# Patient Record
Sex: FEMALE | Race: WHITE | NOT HISPANIC OR LATINO
[De-identification: names, ages, dates, MRNs, and addresses within clinical notes are randomized per-mention and may not be internally consistent; named-entity substitution may affect disease eponyms.]

---

## 2017-10-31 ENCOUNTER — LABORATORY RESULT (OUTPATIENT)
Age: 57
End: 2017-10-31

## 2017-10-31 ENCOUNTER — APPOINTMENT (OUTPATIENT)
Dept: INTERNAL MEDICINE | Facility: CLINIC | Age: 57
End: 2017-10-31
Payer: COMMERCIAL

## 2017-10-31 PROCEDURE — 36415 COLL VENOUS BLD VENIPUNCTURE: CPT

## 2017-11-02 LAB
ALBUMIN SERPL ELPH-MCNC: 4.4 G/DL
ALP BLD-CCNC: 65 U/L
ALT SERPL-CCNC: 18 U/L
ANION GAP SERPL CALC-SCNC: 14 MMOL/L
AST SERPL-CCNC: 23 U/L
BASOPHILS # BLD AUTO: 0.02 K/UL
BASOPHILS NFR BLD AUTO: 0.3 %
BILIRUB SERPL-MCNC: 0.4 MG/DL
BUN SERPL-MCNC: 12 MG/DL
CALCIUM SERPL-MCNC: 9.7 MG/DL
CHLORIDE SERPL-SCNC: 102 MMOL/L
CHOLEST SERPL-MCNC: 206 MG/DL
CHOLEST/HDLC SERPL: 3.2 RATIO
CO2 SERPL-SCNC: 25 MMOL/L
CREAT SERPL-MCNC: 0.9 MG/DL
EOSINOPHIL # BLD AUTO: 0.09 K/UL
EOSINOPHIL NFR BLD AUTO: 1.5 %
ERYTHROCYTE [SEDIMENTATION RATE] IN BLOOD BY WESTERGREN METHOD: 9 MM/HR
GLUCOSE SERPL-MCNC: 82 MG/DL
HCT VFR BLD CALC: 44.4 %
HDLC SERPL-MCNC: 65 MG/DL
HGB BLD-MCNC: 14.4 G/DL
IMM GRANULOCYTES NFR BLD AUTO: 0.3 %
LDLC SERPL CALC-MCNC: 125 MG/DL
LDLC SERPL DIRECT ASSAY-MCNC: 125 MG/DL
LYMPHOCYTES # BLD AUTO: 2 K/UL
LYMPHOCYTES NFR BLD AUTO: 33.8 %
MAN DIFF?: NORMAL
MCHC RBC-ENTMCNC: 28 PG
MCHC RBC-ENTMCNC: 32.4 GM/DL
MCV RBC AUTO: 86.4 FL
MONOCYTES # BLD AUTO: 0.29 K/UL
MONOCYTES NFR BLD AUTO: 4.9 %
NEUTROPHILS # BLD AUTO: 3.5 K/UL
NEUTROPHILS NFR BLD AUTO: 59.2 %
PLATELET # BLD AUTO: 224 K/UL
POTASSIUM SERPL-SCNC: 4.4 MMOL/L
PROT SERPL-MCNC: 7.6 G/DL
RBC # BLD: 5.14 M/UL
RBC # FLD: 14.5 %
SAVE SPECIMEN: NORMAL
SODIUM SERPL-SCNC: 141 MMOL/L
T3 SERPL-MCNC: 118 NG/DL
T3RU NFR SERPL: 1.12 INDEX
T4 FREE SERPL-MCNC: 1.2 NG/DL
TRIGL SERPL-MCNC: 78 MG/DL
TSH SERPL-ACNC: 2.97 UIU/ML
WBC # FLD AUTO: 5.92 K/UL

## 2017-11-07 ENCOUNTER — APPOINTMENT (OUTPATIENT)
Dept: INTERNAL MEDICINE | Facility: CLINIC | Age: 57
End: 2017-11-07
Payer: COMMERCIAL

## 2017-11-07 ENCOUNTER — NON-APPOINTMENT (OUTPATIENT)
Age: 57
End: 2017-11-07

## 2017-11-07 VITALS
DIASTOLIC BLOOD PRESSURE: 64 MMHG | BODY MASS INDEX: 23.98 KG/M2 | HEIGHT: 61 IN | SYSTOLIC BLOOD PRESSURE: 110 MMHG | WEIGHT: 127 LBS | TEMPERATURE: 98.1 F

## 2017-11-07 LAB — CYTOLOGY CVX/VAG DOC THIN PREP: NORMAL

## 2017-11-07 PROCEDURE — 94010 BREATHING CAPACITY TEST: CPT

## 2017-11-07 PROCEDURE — 99396 PREV VISIT EST AGE 40-64: CPT | Mod: 25

## 2017-11-07 PROCEDURE — 93000 ELECTROCARDIOGRAM COMPLETE: CPT

## 2017-12-05 ENCOUNTER — APPOINTMENT (OUTPATIENT)
Dept: OBGYN | Facility: CLINIC | Age: 57
End: 2017-12-05
Payer: COMMERCIAL

## 2017-12-05 VITALS
HEIGHT: 61 IN | BODY MASS INDEX: 24.25 KG/M2 | HEART RATE: 71 BPM | DIASTOLIC BLOOD PRESSURE: 77 MMHG | SYSTOLIC BLOOD PRESSURE: 127 MMHG | WEIGHT: 128.44 LBS

## 2017-12-05 DIAGNOSIS — R07.81 PLEURODYNIA: ICD-10-CM

## 2017-12-05 DIAGNOSIS — Z87.09 PERSONAL HISTORY OF OTHER DISEASES OF THE RESPIRATORY SYSTEM: ICD-10-CM

## 2017-12-05 DIAGNOSIS — Z87.42 PERSONAL HISTORY OF OTHER DISEASES OF THE FEMALE GENITAL TRACT: ICD-10-CM

## 2017-12-05 DIAGNOSIS — Z86.19 PERSONAL HISTORY OF OTHER INFECTIOUS AND PARASITIC DISEASES: ICD-10-CM

## 2017-12-05 DIAGNOSIS — Z78.9 OTHER SPECIFIED HEALTH STATUS: ICD-10-CM

## 2017-12-05 DIAGNOSIS — Z87.898 PERSONAL HISTORY OF OTHER SPECIFIED CONDITIONS: ICD-10-CM

## 2017-12-05 DIAGNOSIS — Z86.79 PERSONAL HISTORY OF OTHER DISEASES OF THE CIRCULATORY SYSTEM: ICD-10-CM

## 2017-12-05 DIAGNOSIS — H81.10 BENIGN PAROXYSMAL VERTIGO, UNSPECIFIED EAR: ICD-10-CM

## 2017-12-05 DIAGNOSIS — R87.810 CERVICAL HIGH RISK HUMAN PAPILLOMAVIRUS (HPV) DNA TEST POSITIVE: ICD-10-CM

## 2017-12-05 DIAGNOSIS — Z87.19 PERSONAL HISTORY OF OTHER DISEASES OF THE DIGESTIVE SYSTEM: ICD-10-CM

## 2017-12-05 PROCEDURE — 99396 PREV VISIT EST AGE 40-64: CPT

## 2017-12-06 ENCOUNTER — APPOINTMENT (OUTPATIENT)
Dept: INTERNAL MEDICINE | Facility: CLINIC | Age: 57
End: 2017-12-06
Payer: COMMERCIAL

## 2017-12-06 LAB — HPV HIGH+LOW RISK DNA PNL CVX: NOT DETECTED

## 2017-12-06 PROCEDURE — 93306 TTE W/DOPPLER COMPLETE: CPT | Mod: 26

## 2017-12-06 PROCEDURE — 93306 TTE W/DOPPLER COMPLETE: CPT | Mod: TC

## 2017-12-10 LAB — CYTOLOGY CVX/VAG DOC THIN PREP: NORMAL

## 2017-12-15 ENCOUNTER — APPOINTMENT (OUTPATIENT)
Dept: INTERNAL MEDICINE | Facility: CLINIC | Age: 57
End: 2017-12-15
Payer: SELF-PAY

## 2017-12-15 ENCOUNTER — APPOINTMENT (OUTPATIENT)
Dept: INTERNAL MEDICINE | Facility: CLINIC | Age: 57
End: 2017-12-15
Payer: COMMERCIAL

## 2017-12-15 VITALS
OXYGEN SATURATION: 100 % | WEIGHT: 128 LBS | HEIGHT: 61 IN | TEMPERATURE: 98 F | BODY MASS INDEX: 24.17 KG/M2 | DIASTOLIC BLOOD PRESSURE: 76 MMHG | SYSTOLIC BLOOD PRESSURE: 124 MMHG

## 2017-12-15 PROCEDURE — 99215 OFFICE O/P EST HI 40 MIN: CPT | Mod: 25

## 2017-12-15 PROCEDURE — 90750 HZV VACC RECOMBINANT IM: CPT

## 2017-12-15 PROCEDURE — 94010 BREATHING CAPACITY TEST: CPT

## 2017-12-15 PROCEDURE — 90471 IMMUNIZATION ADMIN: CPT

## 2018-01-10 ENCOUNTER — OTHER (OUTPATIENT)
Age: 58
End: 2018-01-10

## 2018-01-17 ENCOUNTER — OTHER (OUTPATIENT)
Age: 58
End: 2018-01-17

## 2018-01-19 ENCOUNTER — OTHER (OUTPATIENT)
Age: 58
End: 2018-01-19

## 2018-02-16 ENCOUNTER — APPOINTMENT (OUTPATIENT)
Dept: INTERNAL MEDICINE | Facility: CLINIC | Age: 58
End: 2018-02-16

## 2018-03-26 ENCOUNTER — OTHER (OUTPATIENT)
Age: 58
End: 2018-03-26

## 2019-02-21 ENCOUNTER — APPOINTMENT (OUTPATIENT)
Dept: ORTHOPEDIC SURGERY | Facility: CLINIC | Age: 59
End: 2019-02-21
Payer: COMMERCIAL

## 2019-02-21 VITALS
SYSTOLIC BLOOD PRESSURE: 122 MMHG | WEIGHT: 128 LBS | BODY MASS INDEX: 24.17 KG/M2 | HEART RATE: 66 BPM | HEIGHT: 61 IN | DIASTOLIC BLOOD PRESSURE: 77 MMHG

## 2019-02-21 VITALS — HEIGHT: 60 IN | WEIGHT: 125 LBS | BODY MASS INDEX: 24.54 KG/M2

## 2019-02-21 DIAGNOSIS — S39.012A STRAIN OF MUSCLE, FASCIA AND TENDON OF LOWER BACK, INITIAL ENCOUNTER: ICD-10-CM

## 2019-02-21 PROCEDURE — 99204 OFFICE O/P NEW MOD 45 MIN: CPT

## 2019-02-21 NOTE — HISTORY OF PRESENT ILLNESS
[de-identified] : Patient is a pleasant, 58-year-old female comes in today complaining of right hip pain for one week. Patient points over her right sacroiliac joint has a number one problem today.\par Left hip is okay.\par Patient states that she was at the gym on a decline bench doing sit-ups and reach ups with a ball and felt pain in her right sided low back.\par She has not had this before.\par Pain radiates to her right buttock. Denies any numbness or tingling or bowel or bladder dysfunction.\par Tylenol helps little\par Patient states that she has been going to the gym in the past week and this has been making her worse.\par No fever chills sweats nausea vomiting no bowel or bladder dysfunction\par no recent weight loss or gain no night pain. \par This history is in addition to the intake form that I personally reviewed.

## 2019-02-21 NOTE — PHYSICAL EXAM
[de-identified] : Neurologically intact distally and symmetrical\par otherwise, 5 out of 5 motor strength, sensation is intact and symmetrical full range of motion flexion extension and rotation, no palpatory tenderness full range of motion of hips knees shoulders and elbows (all four extremities), no atrophy, negative straight leg raise, no pathological reflexes, no swelling, normal ambulation, no apparent distress skin is intact, no palpable lymph nodes, no upper or lower extremity instability, alert and oriented x3 and normal mood. Normal finger-to nose test.  [de-identified] : 2 views of the lumbar spine and an AP pelvis: Reveals a grade 1 spondylolisthesis at L4-5 and mild dextroscolios, are negative for any obvious fracture, dislocation\par The patient understands that this is a wet read and I am not a radiologist.\par If the final read reveals something different than discussed in the office the patient will get a call back in the \par next 24-48 hours to discuss.

## 2019-02-21 NOTE — DISCUSSION/SUMMARY
[de-identified] : Lumbar sprain strain\par Left sacroiliitis\par Dextroscoliosis\par Lumbar spondylolisthes, L4-5, grade 1\par Diclofenac\par Exercise as tolerated\par Refusing PT, she goes to the gym.\par Followup with Dr. Adonis Hopkins, or another spine specialist in 2 weeks if not better.\par Options discussed.\par All questions answered.\par The patient is in full agreement with the plan, \par and happy with the visit\par

## 2019-03-08 ENCOUNTER — APPOINTMENT (OUTPATIENT)
Dept: ORTHOPEDIC SURGERY | Facility: CLINIC | Age: 59
End: 2019-03-08

## 2019-04-05 ENCOUNTER — APPOINTMENT (OUTPATIENT)
Dept: INTERNAL MEDICINE | Facility: CLINIC | Age: 59
End: 2019-04-05
Payer: COMMERCIAL

## 2019-04-05 PROCEDURE — 36415 COLL VENOUS BLD VENIPUNCTURE: CPT

## 2019-04-06 LAB
ALBUMIN SERPL ELPH-MCNC: 4.3 G/DL
ALP BLD-CCNC: 63 U/L
ALT SERPL-CCNC: 23 U/L
ANION GAP SERPL CALC-SCNC: 9 MMOL/L
APPEARANCE: CLEAR
AST SERPL-CCNC: 25 U/L
BACTERIA: NEGATIVE
BASOPHILS # BLD AUTO: 0.04 K/UL
BASOPHILS NFR BLD AUTO: 0.6 %
BILIRUB SERPL-MCNC: 0.4 MG/DL
BILIRUBIN URINE: NEGATIVE
BLOOD URINE: NEGATIVE
BUN SERPL-MCNC: 11 MG/DL
CALCIUM SERPL-MCNC: 9.5 MG/DL
CHLORIDE SERPL-SCNC: 104 MMOL/L
CHOLEST SERPL-MCNC: 177 MG/DL
CHOLEST/HDLC SERPL: 3 RATIO
CO2 SERPL-SCNC: 27 MMOL/L
COLOR: NORMAL
CREAT SERPL-MCNC: 0.8 MG/DL
EOSINOPHIL # BLD AUTO: 0.18 K/UL
EOSINOPHIL NFR BLD AUTO: 2.6 %
GLUCOSE QUALITATIVE U: NEGATIVE
GLUCOSE SERPL-MCNC: 82 MG/DL
HCT VFR BLD CALC: 43.4 %
HDLC SERPL-MCNC: 59 MG/DL
HGB BLD-MCNC: 14 G/DL
HYALINE CASTS: 0 /LPF
IMM GRANULOCYTES NFR BLD AUTO: 0.1 %
KETONES URINE: NEGATIVE
LDLC SERPL CALC-MCNC: 106 MG/DL
LEUKOCYTE ESTERASE URINE: NEGATIVE
LYMPHOCYTES # BLD AUTO: 2.69 K/UL
LYMPHOCYTES NFR BLD AUTO: 39.5 %
MAN DIFF?: NORMAL
MCHC RBC-ENTMCNC: 27.8 PG
MCHC RBC-ENTMCNC: 32.3 GM/DL
MCV RBC AUTO: 86.3 FL
MICROSCOPIC-UA: NORMAL
MONOCYTES # BLD AUTO: 0.33 K/UL
MONOCYTES NFR BLD AUTO: 4.8 %
NEUTROPHILS # BLD AUTO: 3.56 K/UL
NEUTROPHILS NFR BLD AUTO: 52.4 %
NITRITE URINE: NEGATIVE
PH URINE: 8
PLATELET # BLD AUTO: 237 K/UL
POTASSIUM SERPL-SCNC: 4.4 MMOL/L
PROT SERPL-MCNC: 6.8 G/DL
PROTEIN URINE: NEGATIVE
RBC # BLD: 5.03 M/UL
RBC # FLD: 14.3 %
RED BLOOD CELLS URINE: 3 /HPF
SAVE SPECIMEN: NORMAL
SODIUM SERPL-SCNC: 140 MMOL/L
SPECIFIC GRAVITY URINE: 1.01
SQUAMOUS EPITHELIAL CELLS: 2 /HPF
TRIGL SERPL-MCNC: 58 MG/DL
TSH SERPL-ACNC: 2.15 UIU/ML
UROBILINOGEN URINE: NORMAL
WBC # FLD AUTO: 6.81 K/UL
WHITE BLOOD CELLS URINE: 2 /HPF

## 2019-04-16 ENCOUNTER — APPOINTMENT (OUTPATIENT)
Dept: INTERNAL MEDICINE | Facility: CLINIC | Age: 59
End: 2019-04-16
Payer: COMMERCIAL

## 2019-04-16 VITALS
BODY MASS INDEX: 24.74 KG/M2 | OXYGEN SATURATION: 98 % | WEIGHT: 126 LBS | SYSTOLIC BLOOD PRESSURE: 100 MMHG | DIASTOLIC BLOOD PRESSURE: 78 MMHG | HEART RATE: 78 BPM | TEMPERATURE: 97.9 F | HEIGHT: 60 IN

## 2019-04-16 DIAGNOSIS — Z11.4 ENCOUNTER FOR SCREENING FOR HUMAN IMMUNODEFICIENCY VIRUS [HIV]: ICD-10-CM

## 2019-04-16 DIAGNOSIS — R92.8 OTHER ABNORMAL AND INCONCLUSIVE FINDINGS ON DIAGNOSTIC IMAGING OF BREAST: ICD-10-CM

## 2019-04-16 DIAGNOSIS — Z11.59 ENCOUNTER FOR SCREENING FOR OTHER VIRAL DISEASES: ICD-10-CM

## 2019-04-16 DIAGNOSIS — Z12.31 ENCOUNTER FOR SCREENING MAMMOGRAM FOR MALIGNANT NEOPLASM OF BREAST: ICD-10-CM

## 2019-04-16 DIAGNOSIS — Z78.9 OTHER SPECIFIED HEALTH STATUS: ICD-10-CM

## 2019-04-16 DIAGNOSIS — Z23 ENCOUNTER FOR IMMUNIZATION: ICD-10-CM

## 2019-04-16 PROCEDURE — 99396 PREV VISIT EST AGE 40-64: CPT | Mod: 25

## 2019-04-16 PROCEDURE — 94010 BREATHING CAPACITY TEST: CPT

## 2019-04-16 PROCEDURE — 36415 COLL VENOUS BLD VENIPUNCTURE: CPT

## 2019-04-16 PROCEDURE — 93000 ELECTROCARDIOGRAM COMPLETE: CPT

## 2019-04-16 RX ORDER — DICLOFENAC SODIUM 75 MG/1
75 TABLET, DELAYED RELEASE ORAL
Qty: 1 | Refills: 1 | Status: DISCONTINUED | COMMUNITY
Start: 2019-02-21 | End: 2019-04-16

## 2019-04-16 RX ORDER — FLUTICASONE PROPIONATE 220 UG/1
220 AEROSOL, METERED RESPIRATORY (INHALATION) TWICE DAILY
Qty: 1 | Refills: 3 | Status: DISCONTINUED | COMMUNITY
Start: 2017-12-15 | End: 2019-04-16

## 2019-04-16 NOTE — HISTORY OF PRESENT ILLNESS
[FreeTextEntry1] : CPE [de-identified] : vaccine\par diet- healthy\par exercise- 3 times/ wk- 1 hr. \par cough improved after using inhaler.  no asthma symptoms- for 1 month

## 2019-04-16 NOTE — PHYSICAL EXAM
[Well Nourished] : well nourished [No Acute Distress] : no acute distress [Well-Appearing] : well-appearing [Well Developed] : well developed [Normal Sclera/Conjunctiva] : normal sclera/conjunctiva [PERRL] : pupils equal round and reactive to light [Normal Outer Ear/Nose] : the outer ears and nose were normal in appearance [Normal Oropharynx] : the oropharynx was normal [Normal TMs] : both tympanic membranes were normal [No Lymphadenopathy] : no lymphadenopathy [Supple] : supple [Thyroid Normal, No Nodules] : the thyroid was normal and there were no nodules present [No Respiratory Distress] : no respiratory distress  [No Accessory Muscle Use] : no accessory muscle use [Clear to Auscultation] : lungs were clear to auscultation bilaterally [Normal Rate] : normal rate  [Regular Rhythm] : with a regular rhythm [Normal S1, S2] : normal S1 and S2 [No Carotid Bruits] : no carotid bruits [No Murmur] : no murmur heard [No Edema] : there was no peripheral edema [Soft] : abdomen soft [Non Tender] : non-tender [Non-distended] : non-distended [No Masses] : no abdominal mass palpated [Normal Bowel Sounds] : normal bowel sounds [Normal Supraclavicular Nodes] : no supraclavicular lymphadenopathy [Normal Axillary Nodes] : no axillary lymphadenopathy [Normal Posterior Cervical Nodes] : no posterior cervical lymphadenopathy [Normal Anterior Cervical Nodes] : no anterior cervical lymphadenopathy [Normal Inguinal Nodes] : no inguinal lymphadenopathy [Grossly Normal Strength/Tone] : grossly normal strength/tone [No Focal Deficits] : no focal deficits [Coordination Grossly Intact] : coordination grossly intact [Normal Affect] : the affect was normal [Normal Insight/Judgement] : insight and judgment were intact [de-identified] : L ear- TM- nl.  R ear cerumen

## 2019-04-16 NOTE — COUNSELING
[Activity counseling provided] : activity [Healthy eating counseling provided] : healthy eating [Good understanding] : Patient has a good understanding of disease, goals and obesity follow-up plan

## 2019-04-16 NOTE — PLAN
[FreeTextEntry1] : pt refused hiv test\par pneum 23 vac out of stock- pt will f/u for vaccine'\par pt will f/u for shingrix vaccine- pt wants to ck w her insurance\par asthma- asymptomatic.  \par EKG- NSR 63\par spirometry-nl

## 2019-04-16 NOTE — HEALTH RISK ASSESSMENT
[0] : 2) Feeling down, depressed, or hopeless: Not at all (0) [Patient reported mammogram was normal] : Patient reported mammogram was normal [Hepatitis C test offered] : Hepatitis C test offered [HIV test declined] : HIV test declined [MammogramDate] : 03/2018 [PapSmearComments] : 3/2018- sees GYN yearly [MammogramComments] : dense breast.  pt gets mammo thru her GYN [ColonoscopyComments] : diverticuli, hemorrhoid- rpt 10 yr. [ColonoscopyDate] : 12/2013 [de-identified] : last seen ophth-10/2018 [de-identified] : last seen dentist- 12/2018

## 2019-10-29 ENCOUNTER — APPOINTMENT (OUTPATIENT)
Dept: OBGYN | Facility: CLINIC | Age: 59
End: 2019-10-29
Payer: COMMERCIAL

## 2019-10-29 VITALS
DIASTOLIC BLOOD PRESSURE: 70 MMHG | SYSTOLIC BLOOD PRESSURE: 104 MMHG | HEIGHT: 60 IN | BODY MASS INDEX: 24.54 KG/M2 | WEIGHT: 125 LBS | HEART RATE: 66 BPM

## 2019-10-29 DIAGNOSIS — N95.2 POSTMENOPAUSAL ATROPHIC VAGINITIS: ICD-10-CM

## 2019-10-29 PROCEDURE — 99396 PREV VISIT EST AGE 40-64: CPT

## 2019-11-01 LAB — HPV HIGH+LOW RISK DNA PNL CVX: NOT DETECTED

## 2019-11-05 LAB — CYTOLOGY CVX/VAG DOC THIN PREP: ABNORMAL

## 2020-05-12 ENCOUNTER — TRANSCRIPTION ENCOUNTER (OUTPATIENT)
Age: 60
End: 2020-05-12

## 2020-08-10 ENCOUNTER — APPOINTMENT (OUTPATIENT)
Dept: OBGYN | Facility: CLINIC | Age: 60
End: 2020-08-10
Payer: COMMERCIAL

## 2020-08-10 VITALS
HEART RATE: 60 BPM | WEIGHT: 131 LBS | BODY MASS INDEX: 25.72 KG/M2 | TEMPERATURE: 98 F | DIASTOLIC BLOOD PRESSURE: 76 MMHG | HEIGHT: 60 IN | SYSTOLIC BLOOD PRESSURE: 120 MMHG

## 2020-08-10 DIAGNOSIS — N64.4 MASTODYNIA: ICD-10-CM

## 2020-08-10 PROCEDURE — 99213 OFFICE O/P EST LOW 20 MIN: CPT

## 2020-08-10 NOTE — PHYSICAL EXAM
[Mass] : no breast mass [Tender] : tenderness [Axillary LAD] : no axillary lymphadenopathy [Nipple Discharge] : no nipple discharge

## 2020-08-10 NOTE — CHIEF COMPLAINT
[Urgent Visit] : Urgent Visit [FreeTextEntry1] : This 60 yo presents c/o left breast pain over the last 2 weeks, took Motrin for the pain with some relief noted; reports regular exercise, to include lifting weights, uncertain if she pulled a muscle, but reports very tender breast. Denies any nipple discharge, no other issues voiced.

## 2020-08-15 ENCOUNTER — APPOINTMENT (OUTPATIENT)
Dept: ULTRASOUND IMAGING | Facility: CLINIC | Age: 60
End: 2020-08-15

## 2020-08-15 ENCOUNTER — APPOINTMENT (OUTPATIENT)
Dept: MAMMOGRAPHY | Facility: CLINIC | Age: 60
End: 2020-08-15

## 2020-08-21 ENCOUNTER — APPOINTMENT (OUTPATIENT)
Dept: INTERNAL MEDICINE | Facility: CLINIC | Age: 60
End: 2020-08-21

## 2020-08-25 ENCOUNTER — LABORATORY RESULT (OUTPATIENT)
Age: 60
End: 2020-08-25

## 2020-08-25 ENCOUNTER — APPOINTMENT (OUTPATIENT)
Dept: INTERNAL MEDICINE | Facility: CLINIC | Age: 60
End: 2020-08-25
Payer: COMMERCIAL

## 2020-08-25 VITALS
SYSTOLIC BLOOD PRESSURE: 100 MMHG | TEMPERATURE: 96.1 F | WEIGHT: 131 LBS | HEIGHT: 60 IN | OXYGEN SATURATION: 99 % | HEART RATE: 69 BPM | DIASTOLIC BLOOD PRESSURE: 60 MMHG | BODY MASS INDEX: 25.72 KG/M2

## 2020-08-25 DIAGNOSIS — J45.991 COUGH VARIANT ASTHMA: ICD-10-CM

## 2020-08-25 PROCEDURE — 36415 COLL VENOUS BLD VENIPUNCTURE: CPT

## 2020-08-25 PROCEDURE — 90471 IMMUNIZATION ADMIN: CPT

## 2020-08-25 PROCEDURE — 90750 HZV VACC RECOMBINANT IM: CPT

## 2020-08-25 PROCEDURE — 99396 PREV VISIT EST AGE 40-64: CPT | Mod: 25

## 2020-08-25 PROCEDURE — 93000 ELECTROCARDIOGRAM COMPLETE: CPT

## 2020-08-27 LAB — CYTOLOGY CVX/VAG DOC THIN PREP: NORMAL

## 2020-09-22 ENCOUNTER — APPOINTMENT (OUTPATIENT)
Dept: MAMMOGRAPHY | Facility: IMAGING CENTER | Age: 60
End: 2020-09-22

## 2020-09-22 ENCOUNTER — APPOINTMENT (OUTPATIENT)
Dept: ULTRASOUND IMAGING | Facility: IMAGING CENTER | Age: 60
End: 2020-09-22

## 2020-10-07 ENCOUNTER — APPOINTMENT (OUTPATIENT)
Dept: INTERNAL MEDICINE | Facility: CLINIC | Age: 60
End: 2020-10-07

## 2020-11-03 ENCOUNTER — APPOINTMENT (OUTPATIENT)
Dept: OBGYN | Facility: CLINIC | Age: 60
End: 2020-11-03

## 2021-01-19 ENCOUNTER — APPOINTMENT (OUTPATIENT)
Dept: INTERNAL MEDICINE | Facility: CLINIC | Age: 61
End: 2021-01-19

## 2021-08-20 LAB
ALBUMIN SERPL ELPH-MCNC: 4.6 G/DL
ALP BLD-CCNC: 70 U/L
ALT SERPL-CCNC: 20 U/L
ANION GAP SERPL CALC-SCNC: 15 MMOL/L
APPEARANCE: CLEAR
AST SERPL-CCNC: 25 U/L
BASOPHILS # BLD AUTO: 0.05 K/UL
BASOPHILS NFR BLD AUTO: 0.7 %
BILIRUB SERPL-MCNC: 0.3 MG/DL
BILIRUBIN URINE: NEGATIVE
BLOOD URINE: NEGATIVE
BUN SERPL-MCNC: 10 MG/DL
CALCIUM SERPL-MCNC: 9.7 MG/DL
CHLORIDE SERPL-SCNC: 102 MMOL/L
CHOLEST SERPL-MCNC: 204 MG/DL
CHOLEST/HDLC SERPL: 3.5 RATIO
CO2 SERPL-SCNC: 22 MMOL/L
COLOR: COLORLESS
CREAT SERPL-MCNC: 0.8 MG/DL
EOSINOPHIL # BLD AUTO: 0.18 K/UL
EOSINOPHIL NFR BLD AUTO: 2.6 %
ERYTHROCYTE [SEDIMENTATION RATE] IN BLOOD BY WESTERGREN METHOD: 19 MM/HR
GLUCOSE QUALITATIVE U: NEGATIVE
GLUCOSE SERPL-MCNC: 80 MG/DL
HCT VFR BLD CALC: 44.2 %
HDLC SERPL-MCNC: 58 MG/DL
HGB BLD-MCNC: 14.3 G/DL
IMM GRANULOCYTES NFR BLD AUTO: 0.3 %
KETONES URINE: NEGATIVE
LDLC SERPL CALC-MCNC: 130 MG/DL
LDLC SERPL DIRECT ASSAY-MCNC: 121 MG/DL
LEUKOCYTE ESTERASE URINE: NEGATIVE
LYMPHOCYTES # BLD AUTO: 2.73 K/UL
LYMPHOCYTES NFR BLD AUTO: 38.9 %
MAN DIFF?: NORMAL
MCHC RBC-ENTMCNC: 27.2 PG
MCHC RBC-ENTMCNC: 32.4 GM/DL
MCV RBC AUTO: 84.2 FL
MONOCYTES # BLD AUTO: 0.32 K/UL
MONOCYTES NFR BLD AUTO: 4.6 %
NEUTROPHILS # BLD AUTO: 3.72 K/UL
NEUTROPHILS NFR BLD AUTO: 52.9 %
NITRITE URINE: NEGATIVE
PH URINE: 7
PLATELET # BLD AUTO: 257 K/UL
POTASSIUM SERPL-SCNC: 4.3 MMOL/L
PROT SERPL-MCNC: 7.1 G/DL
PROTEIN URINE: NEGATIVE
RBC # BLD: 5.25 M/UL
RBC # FLD: 14.1 %
SARS-COV-2 IGG SERPL IA-ACNC: 5.02 INDEX
SARS-COV-2 IGG SERPL QL IA: POSITIVE
SAVE SPECIMEN: NORMAL
SODIUM SERPL-SCNC: 139 MMOL/L
SPECIFIC GRAVITY URINE: 1.01
T3 SERPL-MCNC: 113 NG/DL
T3RU NFR SERPL: 1.2 TBI
T4 FREE SERPL-MCNC: 1.2 NG/DL
TRIGL SERPL-MCNC: 81 MG/DL
TSH SERPL-ACNC: 2.32 UIU/ML
UROBILINOGEN URINE: NORMAL
WBC # FLD AUTO: 7.02 K/UL

## 2021-08-24 ENCOUNTER — LABORATORY RESULT (OUTPATIENT)
Age: 61
End: 2021-08-24

## 2021-08-24 ENCOUNTER — APPOINTMENT (OUTPATIENT)
Dept: INTERNAL MEDICINE | Facility: CLINIC | Age: 61
End: 2021-08-24
Payer: COMMERCIAL

## 2021-08-24 PROCEDURE — 36415 COLL VENOUS BLD VENIPUNCTURE: CPT

## 2021-08-26 PROBLEM — Z00.00 ENCOUNTER FOR PREVENTIVE HEALTH EXAMINATION: Noted: 2021-08-26

## 2021-08-26 LAB
ALBUMIN SERPL ELPH-MCNC: 4.1 G/DL
ALP BLD-CCNC: 72 U/L
ALT SERPL-CCNC: 17 U/L
ANION GAP SERPL CALC-SCNC: 10 MMOL/L
APPEARANCE: CLEAR
AST SERPL-CCNC: 21 U/L
BASOPHILS # BLD AUTO: 0.05 K/UL
BASOPHILS NFR BLD AUTO: 0.7 %
BILIRUB SERPL-MCNC: 0.4 MG/DL
BILIRUBIN URINE: NEGATIVE
BLOOD URINE: NEGATIVE
BUN SERPL-MCNC: 12 MG/DL
CALCIUM SERPL-MCNC: 9.2 MG/DL
CHLORIDE SERPL-SCNC: 105 MMOL/L
CHOLEST SERPL-MCNC: 180 MG/DL
CO2 SERPL-SCNC: 25 MMOL/L
COLOR: YELLOW
CREAT SERPL-MCNC: 0.81 MG/DL
EOSINOPHIL # BLD AUTO: 0.17 K/UL
EOSINOPHIL NFR BLD AUTO: 2.4 %
ERYTHROCYTE [SEDIMENTATION RATE] IN BLOOD BY WESTERGREN METHOD: 12 MM/HR
GLUCOSE QUALITATIVE U: NEGATIVE
GLUCOSE SERPL-MCNC: 92 MG/DL
HCT VFR BLD CALC: 43.3 %
HDLC SERPL-MCNC: 58 MG/DL
HGB BLD-MCNC: 14 G/DL
IMM GRANULOCYTES NFR BLD AUTO: 0.3 %
KETONES URINE: NEGATIVE
LDLC SERPL CALC-MCNC: 109 MG/DL
LDLC SERPL DIRECT ASSAY-MCNC: 109 MG/DL
LEUKOCYTE ESTERASE URINE: NEGATIVE
LYMPHOCYTES # BLD AUTO: 2.48 K/UL
LYMPHOCYTES NFR BLD AUTO: 35.3 %
MAN DIFF?: NORMAL
MCHC RBC-ENTMCNC: 27.2 PG
MCHC RBC-ENTMCNC: 32.3 GM/DL
MCV RBC AUTO: 84.1 FL
MONOCYTES # BLD AUTO: 0.36 K/UL
MONOCYTES NFR BLD AUTO: 5.1 %
NEUTROPHILS # BLD AUTO: 3.94 K/UL
NEUTROPHILS NFR BLD AUTO: 56.2 %
NITRITE URINE: NEGATIVE
NONHDLC SERPL-MCNC: 122 MG/DL
PH URINE: 7
PLATELET # BLD AUTO: 242 K/UL
POTASSIUM SERPL-SCNC: 4.2 MMOL/L
PROT SERPL-MCNC: 6.7 G/DL
PROTEIN URINE: NORMAL
RBC # BLD: 5.15 M/UL
RBC # FLD: 14.5 %
SODIUM SERPL-SCNC: 140 MMOL/L
SPECIFIC GRAVITY URINE: 1.02
T3 SERPL-MCNC: 113 NG/DL
T3RU NFR SERPL: 1.1 TBI
T4 FREE SERPL-MCNC: 1.2 NG/DL
T4 SERPL-MCNC: 7.4 UG/DL
TRIGL SERPL-MCNC: 63 MG/DL
TSH SERPL-ACNC: 2.47 UIU/ML
UROBILINOGEN URINE: NORMAL
WBC # FLD AUTO: 7.02 K/UL

## 2021-08-31 ENCOUNTER — APPOINTMENT (OUTPATIENT)
Dept: INTERNAL MEDICINE | Facility: CLINIC | Age: 61
End: 2021-08-31
Payer: COMMERCIAL

## 2021-08-31 VITALS
HEART RATE: 69 BPM | HEIGHT: 62 IN | TEMPERATURE: 97.5 F | DIASTOLIC BLOOD PRESSURE: 80 MMHG | OXYGEN SATURATION: 98 % | WEIGHT: 129 LBS | BODY MASS INDEX: 23.74 KG/M2 | SYSTOLIC BLOOD PRESSURE: 110 MMHG

## 2021-08-31 DIAGNOSIS — J30.2 OTHER SEASONAL ALLERGIC RHINITIS: ICD-10-CM

## 2021-08-31 PROCEDURE — 99396 PREV VISIT EST AGE 40-64: CPT | Mod: 25

## 2021-08-31 PROCEDURE — 93000 ELECTROCARDIOGRAM COMPLETE: CPT

## 2021-10-25 ENCOUNTER — TRANSCRIPTION ENCOUNTER (OUTPATIENT)
Age: 61
End: 2021-10-25

## 2022-02-21 ENCOUNTER — TRANSCRIPTION ENCOUNTER (OUTPATIENT)
Age: 62
End: 2022-02-21

## 2022-04-01 ENCOUNTER — NON-APPOINTMENT (OUTPATIENT)
Age: 62
End: 2022-04-01

## 2022-04-01 ENCOUNTER — APPOINTMENT (OUTPATIENT)
Dept: ORTHOPEDIC SURGERY | Facility: CLINIC | Age: 62
End: 2022-04-01
Payer: COMMERCIAL

## 2022-04-01 VITALS — HEIGHT: 62 IN | WEIGHT: 131 LBS | BODY MASS INDEX: 24.11 KG/M2

## 2022-04-01 DIAGNOSIS — M22.2X1 PATELLOFEMORAL DISORDERS, RIGHT KNEE: ICD-10-CM

## 2022-04-01 DIAGNOSIS — M25.561 PAIN IN RIGHT KNEE: ICD-10-CM

## 2022-04-01 PROCEDURE — 99203 OFFICE O/P NEW LOW 30 MIN: CPT

## 2022-04-01 PROCEDURE — 73562 X-RAY EXAM OF KNEE 3: CPT | Mod: RT

## 2022-04-01 NOTE — DISCUSSION/SUMMARY
[de-identified] : Discussed findings of today's exam and possible causes of patient's pain.  Educated patient on their most probable diagnosis of right knee pain after repetitive [employer] exercise due to patellofemoral pain syndrome and tendinitis of the medial retinaculum and medial myofascial structures.  Reviewed possible courses of treatment, and we collaboratively decided best course of treatment at this time will include conservative management.  Patient has no significant degenerative changes on x-ray, she has no evidence of any myofascial tear or rupture, no effusion, no evidence of internal derangement.  Patient is given reassurance she has no evidence of any significant musculoskeletal injury, she appears to have some mild inflammation primarily the medial knee structures.  Patient may continue taking Aleve as needed, advised to take 2 tablets with food, 2 times a day for the next 1 to 2 weeks (We discussed all possible side effects of this medication).  Patient advised to  over-the-counter knee compression sleeve with a patella cut out to be worn for support as she returns to physical activity.  We discussed returning to low impact exercise prior to high impact exercise.  If patient has persisting pain may consider course of physical therapy at that time.  Follow up as needed.  Patient appreciates and agrees with current plan.\par \par \par This note was generated using dragon medical dictation software.  A reasonable effort has been made for proofreading its contents, but typos may still remain.  If there are any questions or points of clarification needed please notify my office.\par

## 2022-04-01 NOTE — PHYSICAL EXAM
[de-identified] : Constitutional: Well-nourished, well-developed, No acute distress\par Respiratory:  Good respiratory effort, no SOB\par Lymphatic: No regional lymphadenopathy, no lymphedema\par Psychiatric: Pleasant and normal affect, alert and oriented x3\par Musculoskeletal: normal except where as noted in regional exam\par \par Right knee:\par APPEARANCE: no marked deformities, no swelling or malalignment\par POSITIVE TENDERNESS:  + crepitus of the anterior knee, and tenderness of patellar retinaculum\par NONTENDER: jt lines b/l, patellar & quadriceps tendons, MCL/LCL, ITB at the lateral femoral condyle & Gerdy's tubercle, pes bursa. \par ROM: full & painless, although some discomfort in deep knee flexion\par RESISTIVE TESTING: + discomfort with knee ext from deep knee flexion (stretched position), painless knee flexion. \par SPECIAL TESTS: stable v/v stress. painless grind. neg Lachman's. neg ant/post drawer. neg Julia's. \par  [de-identified] : \par The following radiographs were ordered and read by me during this patient's visit. I reviewed each radiograph in detail with the patient and discussed the findings as highlighted below. \par \par 3 views of the right knee were obtained today that show no fracture, or dislocation. There are no degenerative changes seen. There is no malalignment. No obvious osseous abnormality. Otherwise unremarkable.

## 2022-04-01 NOTE — HISTORY OF PRESENT ILLNESS
[de-identified] : Patient is here for right knee pain that began about a month ago when she was doing a squat with overhead weight press. She states that she felt a sharp pain on the medial aspect of her knee. She has used topical and oral OTC treatments. This is her first time seeking medical evaluation. She feels that she is unable to do her normal physical activities. \par \par The patient's past medical history, past surgical history, medications and allergies were reviewed by me today and documented accordingly. In addition, the patient's family and social history, which were noncontributory to this visit, were reviewed also. Intake form was reviewed. The patient has no family history of arthritis.

## 2022-08-30 ENCOUNTER — LABORATORY RESULT (OUTPATIENT)
Age: 62
End: 2022-08-30

## 2022-08-30 ENCOUNTER — APPOINTMENT (OUTPATIENT)
Dept: INTERNAL MEDICINE | Facility: CLINIC | Age: 62
End: 2022-08-30

## 2022-08-30 PROCEDURE — 36415 COLL VENOUS BLD VENIPUNCTURE: CPT

## 2022-09-01 LAB
ALBUMIN SERPL ELPH-MCNC: 4.3 G/DL
ALP BLD-CCNC: 77 U/L
ALT SERPL-CCNC: 21 U/L
ANION GAP SERPL CALC-SCNC: 12 MMOL/L
APPEARANCE: CLEAR
AST SERPL-CCNC: 26 U/L
BASOPHILS # BLD AUTO: 0.04 K/UL
BASOPHILS NFR BLD AUTO: 0.5 %
BILIRUB SERPL-MCNC: 0.3 MG/DL
BILIRUBIN URINE: NEGATIVE
BLOOD URINE: NEGATIVE
BUN SERPL-MCNC: 15 MG/DL
CALCIUM SERPL-MCNC: 9.3 MG/DL
CHLORIDE SERPL-SCNC: 104 MMOL/L
CHOLEST SERPL-MCNC: 207 MG/DL
CO2 SERPL-SCNC: 24 MMOL/L
COLOR: NORMAL
CREAT SERPL-MCNC: 0.83 MG/DL
EGFR: 80 ML/MIN/1.73M2
EOSINOPHIL # BLD AUTO: 0.12 K/UL
EOSINOPHIL NFR BLD AUTO: 1.6 %
GLUCOSE QUALITATIVE U: NEGATIVE
GLUCOSE SERPL-MCNC: 90 MG/DL
HCT VFR BLD CALC: 42.3 %
HDLC SERPL-MCNC: 60 MG/DL
HGB BLD-MCNC: 14 G/DL
IMM GRANULOCYTES NFR BLD AUTO: 0.5 %
KETONES URINE: NEGATIVE
LDLC SERPL CALC-MCNC: 131 MG/DL
LDLC SERPL DIRECT ASSAY-MCNC: 133 MG/DL
LEUKOCYTE ESTERASE URINE: NEGATIVE
LYMPHOCYTES # BLD AUTO: 3.01 K/UL
LYMPHOCYTES NFR BLD AUTO: 40.3 %
MAN DIFF?: NORMAL
MCHC RBC-ENTMCNC: 27 PG
MCHC RBC-ENTMCNC: 33.1 GM/DL
MCV RBC AUTO: 81.7 FL
MONOCYTES # BLD AUTO: 0.36 K/UL
MONOCYTES NFR BLD AUTO: 4.8 %
NEUTROPHILS # BLD AUTO: 3.89 K/UL
NEUTROPHILS NFR BLD AUTO: 52.3 %
NITRITE URINE: NEGATIVE
NONHDLC SERPL-MCNC: 146 MG/DL
PH URINE: 6
PLATELET # BLD AUTO: 242 K/UL
POTASSIUM SERPL-SCNC: 4.2 MMOL/L
PROT SERPL-MCNC: 7.2 G/DL
PROTEIN URINE: NEGATIVE
RBC # BLD: 5.18 M/UL
RBC # FLD: 14.4 %
SODIUM SERPL-SCNC: 139 MMOL/L
SPECIFIC GRAVITY URINE: 1.01
T3 SERPL-MCNC: 121 NG/DL
T3RU NFR SERPL: 1.2 TBI
T4 FREE SERPL-MCNC: 1.1 NG/DL
TRIGL SERPL-MCNC: 78 MG/DL
TSH SERPL-ACNC: 3.13 UIU/ML
UROBILINOGEN URINE: NORMAL
WBC # FLD AUTO: 7.46 K/UL

## 2022-09-06 ENCOUNTER — APPOINTMENT (OUTPATIENT)
Dept: INTERNAL MEDICINE | Facility: CLINIC | Age: 62
End: 2022-09-06

## 2022-09-06 VITALS
DIASTOLIC BLOOD PRESSURE: 60 MMHG | WEIGHT: 132 LBS | HEIGHT: 62 IN | TEMPERATURE: 97.4 F | SYSTOLIC BLOOD PRESSURE: 90 MMHG | BODY MASS INDEX: 24.29 KG/M2

## 2022-09-06 DIAGNOSIS — Z00.00 ENCOUNTER FOR GENERAL ADULT MEDICAL EXAMINATION W/OUT ABNORMAL FINDINGS: ICD-10-CM

## 2022-09-06 DIAGNOSIS — J45.909 UNSPECIFIED ASTHMA, UNCOMPLICATED: ICD-10-CM

## 2022-09-06 DIAGNOSIS — M85.80 OTHER SPECIFIED DISORDERS OF BONE DENSITY AND STRUCTURE, UNSPECIFIED SITE: ICD-10-CM

## 2022-09-06 DIAGNOSIS — R92.2 INCONCLUSIVE MAMMOGRAM: ICD-10-CM

## 2022-09-06 PROCEDURE — 93000 ELECTROCARDIOGRAM COMPLETE: CPT

## 2022-09-06 PROCEDURE — 99396 PREV VISIT EST AGE 40-64: CPT | Mod: 25

## 2022-12-27 ENCOUNTER — RESULT REVIEW (OUTPATIENT)
Age: 62
End: 2022-12-27

## 2022-12-27 ENCOUNTER — APPOINTMENT (OUTPATIENT)
Dept: ULTRASOUND IMAGING | Facility: CLINIC | Age: 62
End: 2022-12-27

## 2022-12-27 ENCOUNTER — APPOINTMENT (OUTPATIENT)
Dept: MAMMOGRAPHY | Facility: CLINIC | Age: 62
End: 2022-12-27

## 2022-12-27 ENCOUNTER — APPOINTMENT (OUTPATIENT)
Dept: RADIOLOGY | Facility: CLINIC | Age: 62
End: 2022-12-27
Payer: COMMERCIAL

## 2022-12-27 PROCEDURE — 77063 BREAST TOMOSYNTHESIS BI: CPT

## 2022-12-27 PROCEDURE — 77067 SCR MAMMO BI INCL CAD: CPT

## 2022-12-27 PROCEDURE — 76641 ULTRASOUND BREAST COMPLETE: CPT | Mod: 50

## 2022-12-27 PROCEDURE — 77085 DXA BONE DENSITY AXL VRT FX: CPT

## 2023-01-10 ENCOUNTER — TRANSCRIPTION ENCOUNTER (OUTPATIENT)
Age: 63
End: 2023-01-10

## 2023-02-09 ENCOUNTER — EMERGENCY (EMERGENCY)
Facility: HOSPITAL | Age: 63
LOS: 1 days | Discharge: HOME | End: 2023-02-09
Attending: EMERGENCY MEDICINE
Payer: COMMERCIAL

## 2023-02-09 VITALS
HEIGHT: 61 IN | TEMPERATURE: 98 F | HEART RATE: 62 BPM | RESPIRATION RATE: 14 BRPM | DIASTOLIC BLOOD PRESSURE: 84 MMHG | WEIGHT: 130.95 LBS | SYSTOLIC BLOOD PRESSURE: 123 MMHG | OXYGEN SATURATION: 98 %

## 2023-02-09 LAB
ALBUMIN SERPL ELPH-MCNC: 4.4 G/DL — SIGNIFICANT CHANGE UP (ref 3.3–5)
ALP SERPL-CCNC: 80 U/L — SIGNIFICANT CHANGE UP (ref 40–120)
ALT FLD-CCNC: 22 U/L — SIGNIFICANT CHANGE UP (ref 10–45)
ANION GAP SERPL CALC-SCNC: 11 MMOL/L — SIGNIFICANT CHANGE UP (ref 5–17)
APPEARANCE UR: CLEAR — SIGNIFICANT CHANGE UP
APTT BLD: 30.1 SEC — SIGNIFICANT CHANGE UP (ref 27.5–35.5)
AST SERPL-CCNC: 24 U/L — SIGNIFICANT CHANGE UP (ref 10–40)
BACTERIA # UR AUTO: NEGATIVE — SIGNIFICANT CHANGE UP
BASE EXCESS BLDV CALC-SCNC: 6 MMOL/L — HIGH (ref -2–3)
BASOPHILS # BLD AUTO: 0.04 K/UL — SIGNIFICANT CHANGE UP (ref 0–0.2)
BASOPHILS NFR BLD AUTO: 0.4 % — SIGNIFICANT CHANGE UP (ref 0–2)
BILIRUB SERPL-MCNC: 0.2 MG/DL — SIGNIFICANT CHANGE UP (ref 0.2–1.2)
BILIRUB UR-MCNC: NEGATIVE — SIGNIFICANT CHANGE UP
BUN SERPL-MCNC: 15 MG/DL — SIGNIFICANT CHANGE UP (ref 7–23)
CA-I SERPL-SCNC: 1.23 MMOL/L — SIGNIFICANT CHANGE UP (ref 1.15–1.33)
CALCIUM SERPL-MCNC: 9.7 MG/DL — SIGNIFICANT CHANGE UP (ref 8.4–10.5)
CHLORIDE BLDV-SCNC: 99 MMOL/L — SIGNIFICANT CHANGE UP (ref 96–108)
CHLORIDE SERPL-SCNC: 99 MMOL/L — SIGNIFICANT CHANGE UP (ref 96–108)
CO2 BLDV-SCNC: 33 MMOL/L — HIGH (ref 22–26)
CO2 SERPL-SCNC: 25 MMOL/L — SIGNIFICANT CHANGE UP (ref 22–31)
COLOR SPEC: SIGNIFICANT CHANGE UP
CREAT SERPL-MCNC: 0.76 MG/DL — SIGNIFICANT CHANGE UP (ref 0.5–1.3)
DIFF PNL FLD: NEGATIVE — SIGNIFICANT CHANGE UP
EGFR: 89 ML/MIN/1.73M2 — SIGNIFICANT CHANGE UP
EOSINOPHIL # BLD AUTO: 0.03 K/UL — SIGNIFICANT CHANGE UP (ref 0–0.5)
EOSINOPHIL NFR BLD AUTO: 0.3 % — SIGNIFICANT CHANGE UP (ref 0–6)
EPI CELLS # UR: 0 /HPF — SIGNIFICANT CHANGE UP
GAS PNL BLDV: 132 MMOL/L — LOW (ref 136–145)
GAS PNL BLDV: SIGNIFICANT CHANGE UP
GAS PNL BLDV: SIGNIFICANT CHANGE UP
GLUCOSE BLDV-MCNC: 135 MG/DL — HIGH (ref 70–99)
GLUCOSE SERPL-MCNC: 133 MG/DL — HIGH (ref 70–99)
GLUCOSE UR QL: NEGATIVE — SIGNIFICANT CHANGE UP
HCO3 BLDV-SCNC: 32 MMOL/L — HIGH (ref 22–29)
HCT VFR BLD CALC: 43.5 % — SIGNIFICANT CHANGE UP (ref 34.5–45)
HCT VFR BLDA CALC: 44 % — SIGNIFICANT CHANGE UP (ref 34.5–46.5)
HGB BLD CALC-MCNC: 14.7 G/DL — SIGNIFICANT CHANGE UP (ref 11.7–16.1)
HGB BLD-MCNC: 14.3 G/DL — SIGNIFICANT CHANGE UP (ref 11.5–15.5)
IMM GRANULOCYTES NFR BLD AUTO: 0.3 % — SIGNIFICANT CHANGE UP (ref 0–0.9)
INR BLD: 1.12 RATIO — SIGNIFICANT CHANGE UP (ref 0.88–1.16)
KETONES UR-MCNC: SIGNIFICANT CHANGE UP
LACTATE BLDV-MCNC: 1 MMOL/L — SIGNIFICANT CHANGE UP (ref 0.5–2)
LEUKOCYTE ESTERASE UR-ACNC: NEGATIVE — SIGNIFICANT CHANGE UP
LYMPHOCYTES # BLD AUTO: 1.38 K/UL — SIGNIFICANT CHANGE UP (ref 1–3.3)
LYMPHOCYTES # BLD AUTO: 12.2 % — LOW (ref 13–44)
MCHC RBC-ENTMCNC: 27.4 PG — SIGNIFICANT CHANGE UP (ref 27–34)
MCHC RBC-ENTMCNC: 32.9 GM/DL — SIGNIFICANT CHANGE UP (ref 32–36)
MCV RBC AUTO: 83.5 FL — SIGNIFICANT CHANGE UP (ref 80–100)
MONOCYTES # BLD AUTO: 0.35 K/UL — SIGNIFICANT CHANGE UP (ref 0–0.9)
MONOCYTES NFR BLD AUTO: 3.1 % — SIGNIFICANT CHANGE UP (ref 2–14)
NEUTROPHILS # BLD AUTO: 9.44 K/UL — HIGH (ref 1.8–7.4)
NEUTROPHILS NFR BLD AUTO: 83.7 % — HIGH (ref 43–77)
NITRITE UR-MCNC: NEGATIVE — SIGNIFICANT CHANGE UP
NRBC # BLD: 0 /100 WBCS — SIGNIFICANT CHANGE UP (ref 0–0)
PCO2 BLDV: 49 MMHG — HIGH (ref 39–42)
PH BLDV: 7.42 — SIGNIFICANT CHANGE UP (ref 7.32–7.43)
PH UR: 6.5 — SIGNIFICANT CHANGE UP (ref 5–8)
PLATELET # BLD AUTO: 228 K/UL — SIGNIFICANT CHANGE UP (ref 150–400)
PO2 BLDV: 27 MMHG — SIGNIFICANT CHANGE UP (ref 25–45)
POTASSIUM BLDV-SCNC: 5 MMOL/L — SIGNIFICANT CHANGE UP (ref 3.5–5.1)
POTASSIUM SERPL-MCNC: 4 MMOL/L — SIGNIFICANT CHANGE UP (ref 3.5–5.3)
POTASSIUM SERPL-SCNC: 4 MMOL/L — SIGNIFICANT CHANGE UP (ref 3.5–5.3)
PROT SERPL-MCNC: 7.4 G/DL — SIGNIFICANT CHANGE UP (ref 6–8.3)
PROT UR-MCNC: NEGATIVE — SIGNIFICANT CHANGE UP
PROTHROM AB SERPL-ACNC: 13 SEC — SIGNIFICANT CHANGE UP (ref 10.5–13.4)
RAPID RVP RESULT: DETECTED
RBC # BLD: 5.21 M/UL — HIGH (ref 3.8–5.2)
RBC # FLD: 14.2 % — SIGNIFICANT CHANGE UP (ref 10.3–14.5)
RBC CASTS # UR COMP ASSIST: 1 /HPF — SIGNIFICANT CHANGE UP (ref 0–4)
RV+EV RNA SPEC QL NAA+PROBE: DETECTED
SAO2 % BLDV: 45.5 % — LOW (ref 67–88)
SARS-COV-2 RNA SPEC QL NAA+PROBE: SIGNIFICANT CHANGE UP
SODIUM SERPL-SCNC: 135 MMOL/L — SIGNIFICANT CHANGE UP (ref 135–145)
SP GR SPEC: 1.03 — HIGH (ref 1.01–1.02)
TROPONIN T, HIGH SENSITIVITY RESULT: <6 NG/L — SIGNIFICANT CHANGE UP (ref 0–51)
UROBILINOGEN FLD QL: NEGATIVE — SIGNIFICANT CHANGE UP
WBC # BLD: 11.27 K/UL — HIGH (ref 3.8–10.5)
WBC # FLD AUTO: 11.27 K/UL — HIGH (ref 3.8–10.5)
WBC UR QL: 0 /HPF — SIGNIFICANT CHANGE UP (ref 0–5)

## 2023-02-09 PROCEDURE — 70496 CT ANGIOGRAPHY HEAD: CPT | Mod: 26,MA

## 2023-02-09 PROCEDURE — 99223 1ST HOSP IP/OBS HIGH 75: CPT

## 2023-02-09 PROCEDURE — 70498 CT ANGIOGRAPHY NECK: CPT | Mod: 26,MA

## 2023-02-09 RX ORDER — ASPIRIN/CALCIUM CARB/MAGNESIUM 324 MG
81 TABLET ORAL DAILY
Refills: 0 | Status: DISCONTINUED | OUTPATIENT
Start: 2023-02-09 | End: 2023-02-13

## 2023-02-09 RX ORDER — ATORVASTATIN CALCIUM 80 MG/1
80 TABLET, FILM COATED ORAL AT BEDTIME
Refills: 0 | Status: DISCONTINUED | OUTPATIENT
Start: 2023-02-09 | End: 2023-02-13

## 2023-02-09 RX ORDER — ACETAMINOPHEN 500 MG
975 TABLET ORAL ONCE
Refills: 0 | Status: COMPLETED | OUTPATIENT
Start: 2023-02-09 | End: 2023-02-09

## 2023-02-09 RX ADMIN — Medication 975 MILLIGRAM(S): at 17:46

## 2023-02-09 RX ADMIN — Medication 975 MILLIGRAM(S): at 21:30

## 2023-02-09 RX ADMIN — Medication 81 MILLIGRAM(S): at 17:46

## 2023-02-09 RX ADMIN — ATORVASTATIN CALCIUM 80 MILLIGRAM(S): 80 TABLET, FILM COATED ORAL at 21:06

## 2023-02-09 NOTE — ED ADULT NURSE NOTE - OBJECTIVE STATEMENT
Pt is a 61 y/o female pmhx of vertigo presents to the ED c/o of facial droop. Code stroke called upon arrival. Per son at bedside, baseline his mom is very active, last seen normal at 0800 this morning then she went to work, called her family stating she didn't feel well. When family saw her at 10am she had slurred speech and was c/o HA & b/l hand tingling & lightheadedness. Pt presents alert & oriented x 3-4 (believes it is the 8th), slow to respond to questions, calm, able to follow commands, PERRL 4mm. Breathing spontaneous & nonlabored. On cardiac monitor, NSR . Abdomen soft & nondistended. Strength 5/5 x 4 extremities, ambulates without assist. Strong peripheral pulses noted b/l, no edema noted. Skin warm, clean, dry & intact. Denies chest pain, SOB, abdominal pain, back pain, n/v/d, fever, chills, changes in vision. Call bell within reach and pt instructed on how to use, bed in lowest position, side rails up, wheels locked.

## 2023-02-09 NOTE — ED CDU PROVIDER DISPOSITION NOTE - PATIENT PORTAL LINK FT
You can access the FollowMyHealth Patient Portal offered by [facility] by registering at the following website: [email or web address]. By joining [employer]’s FollowMyHealth portal, you will also be able to view your health information using other applications (apps) compatible with our system.

## 2023-02-09 NOTE — ED ADULT NURSE REASSESSMENT NOTE - NS ED NURSE REASSESS COMMENT FT1
Received report from LON Leonard. Pt is A&Ox3, breathing spontaneously, unlabored and speaking in full sentences on RA, on CM, NSR. Neuro check found in flow sheet. Pt safety and comfort measures provided.

## 2023-02-09 NOTE — ED PROVIDER NOTE - OBJECTIVE STATEMENT
61 y/o female with no known PMHx presents to the ED with slurred speech, right sided facial droop, and generalized weakness which began at 8 AM. Symptoms have since improved but not completely resolved. Patient states that when symptoms began she was unable to articulate her words. Patient states that she has never had symptoms like this before. Last known normal was prior to 8 am. Family member reported that right side of her face appeared to be droopy. Patient denies any headache, fever, chest pain, SOB, n/v/d.

## 2023-02-09 NOTE — ED ADULT NURSE REASSESSMENT NOTE - NS ED NURSE REASSESS COMMENT FT1
pt resting in bed,  present, vitals stable, neuro exam wnl, +steady gait in gonzalez, +voiding, denies c/o, MRI head pending

## 2023-02-09 NOTE — ED PROVIDER NOTE - ATTENDING APP SHARED VISIT CONTRIBUTION OF CARE
code stroke called in triage    attending Hany: 62yF no PMH presents after episode of slurred speech, right sided facial droop, and generalized weakness. LKN 8am with symptoms starting around 10am as per son who is accompanying patient. Symptoms have since improved but not completely resolved. Patient states that when symptoms began she was unable to articulate her words. Patient states that she has never had symptoms like this before. Exam as above. Will obtain labs, STAT Ct imaging and neuro eval, reassess

## 2023-02-09 NOTE — ED PROVIDER NOTE - PHYSICAL EXAMINATION
CONSTITUTIONAL: Patient is awake, alert and oriented x 3. Patient is well appearing, mild dysarthria;   HEAD: NCAT  EYES: PERRL bilaterally, EOMI,   ENT: Airway patent, Nasal mucosa clear.   NECK: Supple, No LAD,  LUNGS: CTA B/L, no wheezes, rhonci or rales  HEART: RRR.+S1S2 no murmurs,   ABDOMEN: Soft, non-tender to palpation throughout all four quadrants  MSK: No edema or calf tenderness b/l, FROM upper and lower ext b/l,   SKIN: No rash or lesions  NEURO: No focal deficits, Strength5/5 LE, 4/5 b/l UE, Sensation intact; Gait normal, no facial droop;

## 2023-02-09 NOTE — ED CDU PROVIDER INITIAL DAY NOTE - PHYSICAL EXAMINATION
Gen: AAO x 3, NAD  Skin: No rashes or lesions  HEENT: NC/AT, PERRLA, EOMI, MMM  Resp: unlabored CTAB  Cardiac: rrr s1s2, no murmurs, rubs or gallops  GI: ND, +BS, Soft, NT  Ext: no pedal edema, FROM in all extremities  Neuro: Strength 4/5 BUE and BLE, sensation slightly diminished to left face, mild flattening of right nasolabial fold, clear speech, normal gait, no dysmetria

## 2023-02-09 NOTE — ED CDU PROVIDER INITIAL DAY NOTE - OBJECTIVE STATEMENT
61 y/o female with no known PMHx presents to the ED with slurred speech, right sided facial droop, and generalized weakness which began at 8 AM. Symptoms have since improved but not completely resolved. Patient states that when symptoms began she was unable to articulate her words. Patient states that she has never had symptoms like this before. Last known normal was prior to 8 am. Family member reported that right side of her face appeared to be droopy. Patient denies any headache, fever, chest pain, SOB, n/v/d.    In the ED VSS.  Labs unremarkable.  CT/CTA head and neck negative for acute pathology.  Neuro consult recommending CDU for MRI, TTE and re-evaluation.

## 2023-02-09 NOTE — ED CDU PROVIDER INITIAL DAY NOTE - ATTENDING APP SHARED VISIT CONTRIBUTION OF CARE
attending Hany: pt with episode of speech disturbance and facial droop, initially called as code stroke on arrival. Plan for CDU for telemetry monitoring, neuro checks, MRI, TTE, neuro following, frequent reevaluations

## 2023-02-09 NOTE — STROKE CODE NOTE - ACTIVATED STROKE TEAM
STABLE  She follows up with oncology every 6 months, gets an annual mammogram  -should be seen in the fall 2019 by oncology  1 year of tamoxifen left.   Yes

## 2023-02-09 NOTE — ED CDU PROVIDER DISPOSITION NOTE - NSFOLLOWUPINSTRUCTIONS_ED_ALL_ED_FT
Hydrate.     Please start taking Aspirin 81mg once a day.   Please start taking Atorvastatin 80mg nightly.      We recommend you follow up with your primary care provider within the next 2-3 days, please bring all of your results with you.     You can also follow up with ****NEURO    Please return to the Emergency Department with new, worsening, or concerning symptoms, such as:  -Shortness of breath or trouble breathing  -Pressure, pain, tightness in chest  -Facial drooping, arm weakness, or speech difficulty   -Head injury or loss of consciousness   -Nonstop bleeding or an open wound     *More detailed information regarding your visit and discharge can be found by reviewing this packet Stay hydrated.  Take Aspirin 81mg daily.   Take Atorvastatin 40mg daily.     Follow up with Neurologist Dr. Castellon next week. Call for appointment.    Tawanda Castellon)  Neurology; Vascular Neurology  [address] [facility] [address] [place], [address]  [place] [address]  Phone: [phone]     Follow up with your primary care provider within the next 2-3 days, please bring all of your results with you. You are leaving prior to receiving your echo results. You will receive a phone call today regarding the results. Follow up incidental frontal bone lesion found on MRI brain.      Please return to the Emergency Department with new, worsening, or concerning symptoms, such as:  -Shortness of breath or trouble breathing  -Pressure, pain, tightness in chest  -Facial drooping, arm weakness, or speech difficulty   or any other concerns.     *More detailed information regarding your visit and discharge can be found by reviewing this packet

## 2023-02-09 NOTE — CONSULT NOTE ADULT - ATTENDING COMMENTS
code stroke called in ED not a tnk or evt candidate  Meghan   ASSESSMENT   62 right handed female  presented to the hospital with slurred speech, right sided facial droop, and generalized weakness. LKN at 0800. At 1000 was seen to be weak by family and demonstrated right sided facial asymmetry, now improving. Decision made to bring her to ED. CODE STROKE called 1337. NIHSS 7. Patient received CT imaging which demonstrated no abnormalities. LVO not found on CT angio. Patient not tenecteplase candidate because outside time window. Not thrombectomy candidate because no LVO.   MRI brain neg except for 1.7cm right frontal bone lesion likely benign vascular or fibro osseous lesion   A1c 5.4      back to baseline 2/10   IMPRESSION   will treat as TIA      RECOMMENDATION   - ASA 81mg PO daily.  - High dose statin therapy - atorvastatin 40mg PO daily. LDL goal <70mg/dL.  - TTE, f/u read   - telemetry  - PT/OT/SS/SLP, OOBC--> no needs  - BP normotension   - check FS, glucose control <180  - GI/DVT ppx  - Counseling on diet, exercise, and medication adherence was done  - Counseling on smoking cessation and alcohol consumption offered when appropriate.  - Pain assessed and judicious use of narcotics when appropriate was discussed.    - Stroke education given when appropriate.  - Importance of fall prevention discussed.   - Differential diagnosis and plan of care discussed with patient and/or family and primary team  - Thank you for allowing me to participate in the care of this patient. Call with questions.   - spoke to cdu, patient and family at bedside   Tawanda Castellon MD  Vascular Neurology  Office: [phone]

## 2023-02-09 NOTE — ED CDU PROVIDER DISPOSITION NOTE - CLINICAL COURSE
61 y/o female with no known PMHx presents to the ED with slurred speech, right sided facial droop, and generalized weakness which began at 8 AM. Symptoms have since improved but not completely resolved. Patient states that when symptoms began she was unable to articulate her words. Patient states that she has never had symptoms like this before. Last known normal was prior to 8 am. Family member reported that right side of her face appeared to be droopy. Patient denies any headache, fever, chest pain, SOB, n/v/d.  In the ED VSS.  Labs unremarkable.  CT/CTA head and neck negative for acute pathology.  Neuro consult recommending CDU for MRI, TTE and re-evaluation. 63 y/o female with no known PMHx presents to the ED with slurred speech, right sided facial droop, and generalized weakness which began at 8 AM. Symptoms have since improved but not completely resolved. Patient states that when symptoms began she was unable to articulate her words. Patient states that she has never had symptoms like this before. Last known normal was prior to 8 am. Family member reported that right side of her face appeared to be droopy. Patient denies any headache, fever, chest pain, SOB, n/v/d.  In the ED VSS.  Labs unremarkable.  CT/CTA head and neck negative for acute pathology.  Neuro consult recommending CDU for MRI, TTE and re-evaluation. MRI negative for stroke. echo done but pending results. pt was seen by neurology and cleared for d/c home. pt was discharged.

## 2023-02-09 NOTE — CONSULT NOTE ADULT - ASSESSMENT
ASSESSMENT   62 right handed female with no past medical history presented to the hospital with slurred speech, right sided facial droop, and generalized weakness. LKN at 0800. At 1000 was seen to be weak by family and demonstrated right sided facial asymmetry, now improving. Decision made to bring her to ED. CODE STROKE called 1337. NIHSS 7. Patient received CT imaging which demonstrated no abnormalities. LVO not found on CT angio. Patient not tenecteplase candidate because outside time window. Not thrombectomy candidate because no LVO.     IMPRESSION     Right sided facial droop and right predominant drift 2/2 left cerebral dysfunction due to possible acute ischemic stroke of unknown mechanism      RECOMMENDATION   [] Aspirin 81mg daily   [] Atorvastatin 80 mg daily titrated per LDL < 70  [] HgbA1C, fasting lipid panel, CBC, CMP, coag panel,   [] MRI brain w/o con  [] TTE w/ bubble study  [] telemetry to check for arrhythmia, EKG, will discuss loop recorder    - Tight glucose control (long-term goal HgbA1c < 6%)  - Stroke education and counseling  - Neuro-checks and VS q4h  - Permissive HTN up to 220/120 for 24-48h from symptom onset  - Dysphagia screen. If fails, speech/swallow eval  - aspiration, fall precautions  - STAT CT head non-contrast for change in neuro exam.   - PT/ OT / DVT ppx per primary team     Discussed with stroke fellow  Terry De Leon and under supervision of attending Dr. Kusum Peters regarding decision against candidacy for tenecteplase/ thrombectomy. Will be formally staffed on morning rounds with attending. Recommendations will be complete once signed by attending.      Patient to be seen by team and attending. Note finalized upon attending attestation.  ASSESSMENT   62 right handed female with no past medical history presented to the hospital with slurred speech, right sided facial droop, and generalized weakness. LKN at 0800. At 1000 was seen to be weak by family and demonstrated right sided facial asymmetry, now improving. Decision made to bring her to ED. CODE STROKE called 1337. NIHSS 7. Patient received CT imaging which demonstrated no abnormalities. LVO not found on CT angio. Patient not tenecteplase candidate because outside time window. Not thrombectomy candidate because no LVO.     IMPRESSION     Right sided facial droop and right predominant drift 2/2 left cerebral dysfunction due to possible acute ischemic stroke of unknown mechanism      RECOMMENDATION   [] CDU   [] Aspirin 81mg daily   [] Atorvastatin 80 mg daily titrated per LDL < 70  [] HgbA1C, fasting lipid panel, CBC, CMP, coag panel,   [] MRI brain w/o con  [] TTE w/ bubble study  [] telemetry to check for arrhythmia, EKG, will discuss loop recorder    - Tight glucose control (long-term goal HgbA1c < 6%)  - Stroke education and counseling  - Neuro-checks and VS q4h  - Permissive HTN up to 220/120 for 24-48h from symptom onset  - Dysphagia screen. If fails, speech/swallow eval  - aspiration, fall precautions  - STAT CT head non-contrast for change in neuro exam.   - PT/ OT / DVT ppx per primary team     Discussed with stroke fellow  Terry De Leon and under supervision of attending Dr. Kusum Peters regarding decision against candidacy for tenecteplase/ thrombectomy. Will be formally staffed on morning rounds with attending. Recommendations will be complete once signed by attending.

## 2023-02-09 NOTE — ED CDU PROVIDER DISPOSITION NOTE - CARE PROVIDER_API CALL
Tawanda Castellon)  Neurology; Vascular Neurology  [address] [facility] [place] [address]  [place] [address]  Phone: [phone]  Fax: [phone]  Follow Up Time:

## 2023-02-09 NOTE — ED ADULT TRIAGE NOTE - NS ED NURSE BANDS TYPE
Detail Level: Detailed
Quality 431: Preventive Care And Screening: Unhealthy Alcohol Use - Screening: Patient not identified as an unhealthy alcohol user when screened for unhealthy alcohol use using a systematic screening method
Quality 226: Preventive Care And Screening: Tobacco Use: Screening And Cessation Intervention: Patient screened for tobacco use and is an ex/non-smoker
Name band;

## 2023-02-09 NOTE — ED CDU PROVIDER INITIAL DAY NOTE - DETAILS
vital signs q4h, neuro checks q4h, MRI, TTE, neuro consultation, frequent re-evaluations  case d/w Dr. Leach

## 2023-02-09 NOTE — ED PROVIDER NOTE - DISPOSITION TYPE
Gastric tube connected to low continuous suction./The nasogastric tube (see size above) was inserted via the anatomic location./Placement was confirmed by aspiration of gastric secretions.
OBSERVATION

## 2023-02-09 NOTE — CONSULT NOTE ADULT - SUBJECTIVE AND OBJECTIVE BOX
**STROKE CODE CONSULT NOTE**    Last known well time/Time of onset of symptoms: 0800 2/9/2023    HPI: 62 right handed female with no past medical history presented to the hospital with slurred speech, right sided facial droop, and generalized weakness. LKN at 0800. At 1000 was seen to be weak by family and demonstrated right sided facial asymmetry, now improving. Decision made to bring her to ED. CODE STROKE called 1337. NIHSS 7. Patient received CT imaging which demonstrated no abnormalities. LVO not found on CT angio. Patient not tenecteplase candidate because outside time window. Not thrombectomy candidate because no LVO.     PAST MEDICAL & SURGICAL HISTORY:      FAMILY HISTORY:      SOCIAL HISTORY:  No smoke, alcohol, nor drugs     ROS:  Eyes: visual disturbances  Neurological: As per HPI  MEDICATIONS  (STANDING):    MEDICATIONS  (PRN):      Allergies    No Known Allergies    Intolerances        Vital Signs Last 24 Hrs  T(C): 36.4 (09 Feb 2023 13:33), Max: 36.4 (09 Feb 2023 13:33)  T(F): 97.6 (09 Feb 2023 13:33), Max: 97.6 (09 Feb 2023 13:33)  HR: 62 (09 Feb 2023 13:33) (62 - 62)  BP: 123/84 (09 Feb 2023 13:33) (123/84 - 123/84)  BP(mean): --  RR: 14 (09 Feb 2023 13:33) (14 - 14)  SpO2: 98% (09 Feb 2023 13:33) (98% - 98%)    Parameters below as of 09 Feb 2023 13:33  Patient On (Oxygen Delivery Method): room air        PHYSICAL EXAM:  Constitutional: WDWN; NAD  Cardiovascular: RRR, no appreciable murmurs; no carotid bruits  Neurologic:  Mental status: Awake, alert and oriented x3.  Recent and remote memory intact.  Naming, repetition and comprehension intact.  Attention/concentration intact.  Slight dysarthria, no aphasia.  Fund of knowledge appropriate.    Cranial nerves: pupils equally round and reactive to light, visual fields full, no nystagmus, extraocular muscles intact, V1 through V3 intact bilaterally and symmetric, slight right sided facial droop elicited upon smiling with no concurrent brow furrowing , hearing intact to finger rub, palate elevation symmetric, tongue was midline, sternocleidomastoid/shoulder shrug strength bilaterally 5/5.    Motor:  Normal bulk and tone, strength 4/5 in bilateral upper and lower extremities.   strength 5/5.  Rapid alternating movements intact and symmetric.   Sensation: Intact to light touch No neglect.   Coordination: No dysmetria on finger-to-nose   Reflexes: 2+ in upper and lower extremities, downgoing toes bilaterally  Gait: Narrow and steady. No ataxia.  Romberg negative    NIHSS: 7    Fingerstick Blood Glucose: CAPILLARY BLOOD GLUCOSE      POCT Blood Glucose.: 123 mg/dL (09 Feb 2023 13:27)       LABS:                        14.3   11.27 )-----------( 228      ( 09 Feb 2023 13:48 )             43.5                     RADIOLOGY & ADDITIONAL STUDIES:    IV-tenecteplase(Y/N):             N                      Bolus time:  Reason IV-tenecteplase not given: outside stroke window

## 2023-02-10 VITALS
HEART RATE: 66 BPM | OXYGEN SATURATION: 98 % | RESPIRATION RATE: 17 BRPM | TEMPERATURE: 99 F | DIASTOLIC BLOOD PRESSURE: 71 MMHG | SYSTOLIC BLOOD PRESSURE: 116 MMHG

## 2023-02-10 LAB
A1C WITH ESTIMATED AVERAGE GLUCOSE RESULT: 5.4 % — SIGNIFICANT CHANGE UP (ref 4–5.6)
CHOLEST SERPL-MCNC: 184 MG/DL — SIGNIFICANT CHANGE UP
CULTURE RESULTS: SIGNIFICANT CHANGE UP
ESTIMATED AVERAGE GLUCOSE: 108 MG/DL — SIGNIFICANT CHANGE UP (ref 68–114)
HDLC SERPL-MCNC: 60 MG/DL — SIGNIFICANT CHANGE UP
LIPID PNL WITH DIRECT LDL SERPL: 111 MG/DL — HIGH
NON HDL CHOLESTEROL: 124 MG/DL — SIGNIFICANT CHANGE UP
SPECIMEN SOURCE: SIGNIFICANT CHANGE UP
TRIGL SERPL-MCNC: 61 MG/DL — SIGNIFICANT CHANGE UP

## 2023-02-10 PROCEDURE — 87086 URINE CULTURE/COLONY COUNT: CPT

## 2023-02-10 PROCEDURE — 84484 ASSAY OF TROPONIN QUANT: CPT

## 2023-02-10 PROCEDURE — 93306 TTE W/DOPPLER COMPLETE: CPT | Mod: 26

## 2023-02-10 PROCEDURE — 70551 MRI BRAIN STEM W/O DYE: CPT | Mod: MA

## 2023-02-10 PROCEDURE — 82947 ASSAY GLUCOSE BLOOD QUANT: CPT

## 2023-02-10 PROCEDURE — 36415 COLL VENOUS BLD VENIPUNCTURE: CPT

## 2023-02-10 PROCEDURE — 85025 COMPLETE CBC W/AUTO DIFF WBC: CPT

## 2023-02-10 PROCEDURE — 82962 GLUCOSE BLOOD TEST: CPT

## 2023-02-10 PROCEDURE — 84132 ASSAY OF SERUM POTASSIUM: CPT

## 2023-02-10 PROCEDURE — 80053 COMPREHEN METABOLIC PANEL: CPT

## 2023-02-10 PROCEDURE — 70450 CT HEAD/BRAIN W/O DYE: CPT | Mod: MA

## 2023-02-10 PROCEDURE — 82803 BLOOD GASES ANY COMBINATION: CPT

## 2023-02-10 PROCEDURE — 70498 CT ANGIOGRAPHY NECK: CPT | Mod: MA

## 2023-02-10 PROCEDURE — 85014 HEMATOCRIT: CPT

## 2023-02-10 PROCEDURE — 85018 HEMOGLOBIN: CPT

## 2023-02-10 PROCEDURE — 85610 PROTHROMBIN TIME: CPT

## 2023-02-10 PROCEDURE — 0225U NFCT DS DNA&RNA 21 SARSCOV2: CPT

## 2023-02-10 PROCEDURE — 84295 ASSAY OF SERUM SODIUM: CPT

## 2023-02-10 PROCEDURE — C8929: CPT

## 2023-02-10 PROCEDURE — 83036 HEMOGLOBIN GLYCOSYLATED A1C: CPT

## 2023-02-10 PROCEDURE — 82330 ASSAY OF CALCIUM: CPT

## 2023-02-10 PROCEDURE — 82435 ASSAY OF BLOOD CHLORIDE: CPT

## 2023-02-10 PROCEDURE — 85730 THROMBOPLASTIN TIME PARTIAL: CPT

## 2023-02-10 PROCEDURE — 70551 MRI BRAIN STEM W/O DYE: CPT | Mod: 26,MA

## 2023-02-10 PROCEDURE — G0378: CPT

## 2023-02-10 PROCEDURE — 83605 ASSAY OF LACTIC ACID: CPT

## 2023-02-10 PROCEDURE — 81001 URINALYSIS AUTO W/SCOPE: CPT

## 2023-02-10 PROCEDURE — 99291 CRITICAL CARE FIRST HOUR: CPT | Mod: 25

## 2023-02-10 PROCEDURE — 70496 CT ANGIOGRAPHY HEAD: CPT | Mod: MA

## 2023-02-10 PROCEDURE — 80061 LIPID PANEL: CPT

## 2023-02-10 RX ORDER — ATORVASTATIN CALCIUM 80 MG/1
1 TABLET, FILM COATED ORAL
Qty: 30 | Refills: 0
Start: 2023-02-10 | End: 2023-03-11

## 2023-02-10 RX ADMIN — Medication 81 MILLIGRAM(S): at 11:38

## 2023-02-10 NOTE — ED CDU PROVIDER SUBSEQUENT DAY NOTE - PROGRESS NOTE DETAILS
Pt comfortable. No new complaints. Feeling much better today. Vital signs stable. MRI negative for stroke. There was a 1.7 cm frontal bone lesion incidentally noted that was discussed with pt. pt seen by neurology attending Dr. Castellon and cleared for d/c home on asa/statin (lipitor 40mg) with outpt followup. pt wants to leave now, does not want to wait for echo results. advised pt she should wait here for results but she declines, states we can call her later. discussed with Dr. Antunez, will d/c pt home and will call her later with results. -Daniela Putnam PA-C echo non-actionable. pt was called with results. -Daniela Putnam PA-C

## 2023-02-10 NOTE — ED CDU PROVIDER SUBSEQUENT DAY NOTE - PHYSICAL EXAMINATION
Gen: AAO x 3, NAD  	Skin: No visible rashes or lesions  	HEENT: NC/AT, PERRLA, EOMI  	Resp: unlabored CTAB  	Cardiac: rrr s1s2, no murmurs, rubs or gallops  	GI: ND, +BS, Soft, NT  	Ext: no pedal edema, FROM in all extremities  Neuro: Strength 4/5 BUE and BLE, mild flattening of right nasolabial fold, clear speech, sensation grossly intact

## 2023-02-10 NOTE — ED CDU PROVIDER SUBSEQUENT DAY NOTE - CLINICAL SUMMARY MEDICAL DECISION MAKING FREE TEXT BOX
Attending MD Antunez: 62-year-old woman observed in emergency department observation area overnight for evaluation of transient dysarthria word finding difficulties and possible right-sided facial droop that occurred yesterday at 8 AM.  Symptoms lasted around 30 minutes.  She has not had recurrence of symptoms.  This morning she denies any active symptoms.    Examination this morning with normal vital signs.  Awake and alert. Symmetric eyebrow raise, symmetric eyelid closure. PERRL b/l, EOMI b/l, symmetric smile, tongue midline.  5/5  strength bilaterally, 5/5 elbow flexion bilaterally, 5/5 elbow extension bilaterally, 5/5 shoulder shrug b/l.  5/5 plantar and dorsiflexion b/l, 5/5 knee flexion and extension b/l, 5/5 hip flexion and extension b/l. Sensation intact and symmetric grossly to light touch throughout face and bilateral upper and lower extremities,  Finger to nose normal bilaterally. Heart sounds regular, no obvious murmur, clear lungs anteriorly, peripheral pulses full and equal in bilateral upper and lower extremities    Labs reviewed from main emergency department notable for normal electrolytes, and incidentally positive enterovirus on viral panel.  CT of the brain normal, CT angiogram of head and neck without vessel stenosis or other vascular abnormality.  MRI performed this morning resulted and negative for acute infarct or mass.  There was a 1.7 cm frontal bone lesion incidentally noted.  Patient made aware of this.  Patient is pending TTE as last test for possible TIA work-up.  Patient maintained on telemetry and without any episodes of dysrhythmia and specifically no episodes of atrial fibrillation overnight.  EKG from 2/9 at 1424 personally reviewed by me normal sinus rhythm no acute ischemic changes.    We will follow-up on echocardiogram results and case will be discussed with neurology as they have been following along with us during patient's observation stay.  The patient's ultimate disposition and treatment plan will be in accordance with neurology service final recommendations.  Patient has been maintained on Lipitor overnight as well as aspirin.  We will clarify with neurology should patient be discharged what her medication regiment will be upon discharge given clinical concern for possible TIA.

## 2023-02-10 NOTE — ED CDU PROVIDER SUBSEQUENT DAY NOTE - ATTENDING APP SHARED VISIT CONTRIBUTION OF CARE
Attending MD Antunez:   I personally have seen and examined this patient. I have performed a substantive portion of the visit including all aspects of the medical decision making.   Physician assistant note reviewed and agree on plan of care and except where noted.            *The above represents an initial assessment/impression. Please refer to progress notes for potential changes in patient clinical course*

## 2023-02-10 NOTE — ED CDU PROVIDER SUBSEQUENT DAY NOTE - HISTORY
No interval changes since initial CDU provider note. Pt feels well without new complaint. NAD VSS. no events on tele. Plan for echo and MRI in the setting of weakness/change in speech. Neurology following.  - GRETCHEN Cohn

## 2023-02-10 NOTE — ED CDU PROVIDER SUBSEQUENT DAY NOTE - NS ED ROS FT
Constitutional: No fever or chills  Eyes: No visual changes, no eye pain   CV: No chest pain or lower extremity edema  Resp: No SOB no cough  GI: No abd pain. No nausea or vomiting. No diarrhea.   : No dysuria, hematuria.   MSK: No musculoskeletal pain  Skin: No rash  Psych: No complaints   Neuro: + weakness. +facial droop +slurred speech  Endo: No known diabetes

## 2023-02-21 ENCOUNTER — APPOINTMENT (OUTPATIENT)
Dept: INTERNAL MEDICINE | Facility: CLINIC | Age: 63
End: 2023-02-21
Payer: COMMERCIAL

## 2023-02-21 VITALS
SYSTOLIC BLOOD PRESSURE: 124 MMHG | OXYGEN SATURATION: 97 % | DIASTOLIC BLOOD PRESSURE: 70 MMHG | HEIGHT: 62 IN | WEIGHT: 130 LBS | BODY MASS INDEX: 23.92 KG/M2 | HEART RATE: 67 BPM

## 2023-02-21 DIAGNOSIS — G45.9 TRANSIENT CEREBRAL ISCHEMIC ATTACK, UNSPECIFIED: ICD-10-CM

## 2023-02-21 PROCEDURE — 99495 TRANSJ CARE MGMT MOD F2F 14D: CPT

## 2023-03-01 PROBLEM — Z78.9 OTHER SPECIFIED HEALTH STATUS: Chronic | Status: ACTIVE | Noted: 2023-02-10

## 2023-03-02 ENCOUNTER — OFFICE (OUTPATIENT)
Dept: URBAN - METROPOLITAN AREA CLINIC 27 | Facility: CLINIC | Age: 63
Setting detail: OPHTHALMOLOGY
End: 2023-03-02
Payer: COMMERCIAL

## 2023-03-02 DIAGNOSIS — H10.9: ICD-10-CM

## 2023-03-02 PROCEDURE — 92012 INTRM OPH EXAM EST PATIENT: CPT | Performed by: OPHTHALMOLOGY

## 2023-03-02 ASSESSMENT — REFRACTION_CURRENTRX
OD_OVR_VA: 20/
OS_SPHERE: +2.50
OS_OVR_VA: 20/
OD_SPHERE: +2.50

## 2023-03-02 ASSESSMENT — SPHEQUIV_DERIVED
OS_SPHEQUIV: 4.875
OD_SPHEQUIV: 4.25

## 2023-03-02 ASSESSMENT — REFRACTION_AUTOREFRACTION
OS_AXIS: 144
OD_AXIS: 030
OS_CYLINDER: +2.25
OD_CYLINDER: +3.50
OD_SPHERE: +2.50
OS_SPHERE: +3.75

## 2023-03-02 ASSESSMENT — CONFRONTATIONAL VISUAL FIELD TEST (CVF)
OD_FINDINGS: FULL
OS_FINDINGS: FULL

## 2023-03-02 ASSESSMENT — KERATOMETRY
OS_K1POWER_DIOPTERS: 45.00
OS_AXISANGLE_DEGREES: 121
OD_K2POWER_DIOPTERS: 47.75
OD_AXISANGLE_DEGREES: 044
OD_K1POWER_DIOPTERS: 44.50
OS_K2POWER_DIOPTERS: 47.00
METHOD_AUTO_MANUAL: AUTO

## 2023-03-02 ASSESSMENT — DRY EYES - PHYSICIAN NOTES
OS_GENERALCOMMENTS: 1+
OD_GENERALCOMMENTS: 1+

## 2023-03-02 ASSESSMENT — PACHYMETRY
OS_CT_UM: 575
OD_CT_UM: 581
OD_CT_CORRECTION: -3
OS_CT_CORRECTION: -2

## 2023-03-02 ASSESSMENT — VISUAL ACUITY
OS_BCVA: 20/30
OD_BCVA: 20/20-2

## 2023-03-02 ASSESSMENT — AXIALLENGTH_DERIVED
OD_AL: 21.2338
OS_AL: 21.0749

## 2023-03-02 ASSESSMENT — TONOMETRY
OS_IOP_MMHG: 20
OD_IOP_MMHG: 20

## 2023-03-02 ASSESSMENT — SUPERFICIAL PUNCTATE KERATITIS (SPK)
OD_SPK: 1+
OS_SPK: 1+

## 2023-03-07 ENCOUNTER — OFFICE (OUTPATIENT)
Dept: URBAN - METROPOLITAN AREA CLINIC 27 | Facility: CLINIC | Age: 63
Setting detail: OPHTHALMOLOGY
End: 2023-03-07
Payer: COMMERCIAL

## 2023-03-07 ENCOUNTER — RX ONLY (RX ONLY)
Age: 63
End: 2023-03-07

## 2023-03-07 DIAGNOSIS — H16.423: ICD-10-CM

## 2023-03-07 DIAGNOSIS — H10.9: ICD-10-CM

## 2023-03-07 PROBLEM — H16.223 DRY EYE SYNDROME K SICCA; BOTH EYES: Status: ACTIVE | Noted: 2023-03-02

## 2023-03-07 PROCEDURE — 92012 INTRM OPH EXAM EST PATIENT: CPT | Performed by: OPHTHALMOLOGY

## 2023-03-07 ASSESSMENT — KERATOMETRY
OS_K1POWER_DIOPTERS: 45.00
OS_AXISANGLE_DEGREES: 125
OD_K2POWER_DIOPTERS: 47.25
OS_K2POWER_DIOPTERS: 47.25
OD_AXISANGLE_DEGREES: 39
METHOD_AUTO_MANUAL: AUTO
OD_K1POWER_DIOPTERS: 44.75

## 2023-03-07 ASSESSMENT — AXIALLENGTH_DERIVED
OD_AL: 21.1922
OS_AL: 21.0383

## 2023-03-07 ASSESSMENT — TONOMETRY
OS_IOP_MMHG: 20
OS_IOP_MMHG: 20
OD_IOP_MMHG: 18
OD_IOP_MMHG: 20

## 2023-03-07 ASSESSMENT — SPHEQUIV_DERIVED
OS_SPHEQUIV: 4.875
OD_SPHEQUIV: 4.5

## 2023-03-07 ASSESSMENT — REFRACTION_CURRENTRX
OS_OVR_VA: 20/
OD_OVR_VA: 20/
OS_SPHERE: +2.50
OD_SPHERE: +2.50

## 2023-03-07 ASSESSMENT — SUPERFICIAL PUNCTATE KERATITIS (SPK)
OS_SPK: 1+
OD_SPK: 1+

## 2023-03-07 ASSESSMENT — VISUAL ACUITY
OD_BCVA: 20/30+1
OS_BCVA: 20/30+2

## 2023-03-07 ASSESSMENT — DRY EYES - PHYSICIAN NOTES
OD_GENERALCOMMENTS: 1+
OS_GENERALCOMMENTS: 1+

## 2023-03-07 ASSESSMENT — PACHYMETRY
OS_CT_UM: 575
OD_CT_UM: 581
OD_CT_CORRECTION: -3
OS_CT_CORRECTION: -2

## 2023-03-07 ASSESSMENT — REFRACTION_AUTOREFRACTION
OS_AXIS: 147
OD_SPHERE: +2.75
OD_AXIS: 34
OS_CYLINDER: +2.25
OD_CYLINDER: +3.50
OS_SPHERE: +3.75

## 2023-03-07 ASSESSMENT — VASCULARIZATION
OS_VASCULARIZATION: PANNUS
OD_VASCULARIZATION: PANNUS

## 2023-05-02 ENCOUNTER — APPOINTMENT (OUTPATIENT)
Dept: NEUROLOGY | Facility: CLINIC | Age: 63
End: 2023-05-02

## 2023-05-30 NOTE — ED CDU PROVIDER DISPOSITION NOTE - NS_OBSORDERDATE_ED_A_ED
09-Feb-2023 15:48 General Sunscreen Counseling: I recommended a broad spectrum sunscreen with a SPF of 30 or higher. I explained that SPF 30 sunscreens block approximately 97 percent of the sun's harmful rays. Sunscreens should be applied at least 15 minutes prior to expected sun exposure and then every 2 hours after that as long as sun exposure continues. If swimming or exercising sunscreen should be reapplied every 45 minutes to an hour after getting wet or sweating. One ounce, or the equivalent of a shot glass full of sunscreen, is adequate to protect the skin not covered by a bathing suit. I also recommended a lip balm with a sunscreen as well. Sun protective clothing can be used in lieu of sunscreen but must be worn the entire time you are exposed to the sun's rays. Detail Level: Generalized Products Recommended: Judrosa isela Mote at least 2 finger lengths for the face of SPF. Reapply when outdoors.  Use titanium oxide sun screen with SPF 30 or higher

## 2023-09-19 ENCOUNTER — APPOINTMENT (OUTPATIENT)
Dept: INTERNAL MEDICINE | Facility: CLINIC | Age: 63
End: 2023-09-19

## 2023-09-28 ENCOUNTER — APPOINTMENT (OUTPATIENT)
Dept: INTERNAL MEDICINE | Facility: CLINIC | Age: 63
End: 2023-09-28

## 2024-01-16 ENCOUNTER — APPOINTMENT (OUTPATIENT)
Dept: ULTRASOUND IMAGING | Facility: CLINIC | Age: 64
End: 2024-01-16
Payer: COMMERCIAL

## 2024-01-16 ENCOUNTER — APPOINTMENT (OUTPATIENT)
Dept: MAMMOGRAPHY | Facility: CLINIC | Age: 64
End: 2024-01-16
Payer: COMMERCIAL

## 2024-01-16 PROCEDURE — 76641 ULTRASOUND BREAST COMPLETE: CPT | Mod: 50

## 2024-01-16 PROCEDURE — 77067 SCR MAMMO BI INCL CAD: CPT

## 2024-01-16 PROCEDURE — 77063 BREAST TOMOSYNTHESIS BI: CPT

## 2024-02-22 ENCOUNTER — APPOINTMENT (OUTPATIENT)
Dept: OBGYN | Facility: CLINIC | Age: 64
End: 2024-02-22
Payer: COMMERCIAL

## 2024-02-22 VITALS
DIASTOLIC BLOOD PRESSURE: 77 MMHG | HEIGHT: 62 IN | SYSTOLIC BLOOD PRESSURE: 114 MMHG | BODY MASS INDEX: 23.19 KG/M2 | WEIGHT: 126 LBS | HEART RATE: 69 BPM

## 2024-02-22 DIAGNOSIS — N94.2 VAGINISMUS: ICD-10-CM

## 2024-02-22 DIAGNOSIS — Z01.419 ENCOUNTER FOR GYNECOLOGICAL EXAMINATION (GENERAL) (ROUTINE) W/OUT ABNORMAL FINDINGS: ICD-10-CM

## 2024-02-22 DIAGNOSIS — N89.8 OTHER SPECIFIED NONINFLAMMATORY DISORDERS OF VAGINA: ICD-10-CM

## 2024-02-22 PROCEDURE — 99386 PREV VISIT NEW AGE 40-64: CPT

## 2024-02-22 PROCEDURE — 99213 OFFICE O/P EST LOW 20 MIN: CPT | Mod: 25

## 2024-02-22 RX ORDER — ATORVASTATIN CALCIUM 40 MG/1
40 TABLET, FILM COATED ORAL
Refills: 0 | Status: DISCONTINUED | COMMUNITY
End: 2024-02-22

## 2024-02-22 RX ORDER — ALBUTEROL SULFATE 90 UG/1
108 (90 BASE) INHALANT RESPIRATORY (INHALATION)
Qty: 1 | Refills: 5 | Status: DISCONTINUED | COMMUNITY
Start: 2022-09-06 | End: 2024-02-22

## 2024-02-22 RX ORDER — ASPIRIN ENTERIC COATED TABLETS 81 MG 81 MG/1
81 TABLET, DELAYED RELEASE ORAL
Refills: 0 | Status: DISCONTINUED | COMMUNITY
End: 2024-02-22

## 2024-02-22 RX ORDER — MONTELUKAST 10 MG/1
10 TABLET, FILM COATED ORAL
Qty: 30 | Refills: 5 | Status: DISCONTINUED | COMMUNITY
Start: 2017-12-15 | End: 2024-02-22

## 2024-02-22 RX ORDER — CITRULL/ARGIN/PINE XT/ROSE HIP 400-400 MG
TABLET ORAL
Qty: 3 | Refills: 3 | Status: ACTIVE | COMMUNITY
Start: 2024-02-22

## 2024-02-22 NOTE — PHYSICAL EXAM
[Chaperone Present] : A chaperone was present in the examining room during all aspects of the physical examination [Appropriately responsive] : appropriately responsive [Alert] : alert [No Acute Distress] : no acute distress [No Lymphadenopathy] : no lymphadenopathy [Non-tender] : non-tender [Soft] : soft [Non-distended] : non-distended [No HSM] : No HSM [No Lesions] : no lesions [No Mass] : no mass [Oriented x3] : oriented x3 [Examination Of The Breasts] : a normal appearance [No Masses] : no breast masses were palpable [Labia Majora] : normal [Labia Minora] : normal [Atrophy] : atrophy [Normal] : normal [Uterine Adnexae] : normal [FreeTextEntry4] : vaginismus

## 2024-02-22 NOTE — HISTORY OF PRESENT ILLNESS
[FreeTextEntry1] : c/o vag dryness with difficulty having intercourse with  also very painful to have sex [Mammogramdate] : emr [BreastSonogramDate] : emr [PapSmeardate] : emr [ColonoscopyDate] : emr

## 2024-02-22 NOTE — PLAN
[FreeTextEntry1] : Rec Revaree 3x/week. KY jelly with intercourse Differential diagnosis, work up ,management and evaluation of above mentioned concerns extensively reviewed Extensive review of above mentioned concerns. All questions and concerns addressed, patient expressed understanding. Encouraged to contact the office with any questions or concerns.

## 2024-02-25 LAB — HPV HIGH+LOW RISK DNA PNL CVX: NOT DETECTED

## 2024-02-26 LAB — CYTOLOGY CVX/VAG DOC THIN PREP: ABNORMAL

## 2024-04-05 ENCOUNTER — OFFICE (OUTPATIENT)
Dept: URBAN - METROPOLITAN AREA CLINIC 27 | Facility: CLINIC | Age: 64
Setting detail: OPHTHALMOLOGY
End: 2024-04-05
Payer: COMMERCIAL

## 2024-04-05 DIAGNOSIS — H40.033: ICD-10-CM

## 2024-04-05 PROBLEM — H52.7 REFRACTIVE ERROR: Status: ACTIVE | Noted: 2024-04-05

## 2024-04-05 PROBLEM — G43.009 MIGRAINE-W/O AURA: Status: ACTIVE | Noted: 2024-04-05

## 2024-04-05 PROCEDURE — 92250 FUNDUS PHOTOGRAPHY W/I&R: CPT | Performed by: OPTOMETRIST

## 2024-04-05 PROCEDURE — 92014 COMPRE OPH EXAM EST PT 1/>: CPT | Performed by: OPTOMETRIST

## 2024-05-30 ENCOUNTER — APPOINTMENT (OUTPATIENT)
Dept: ORTHOPEDIC SURGERY | Facility: CLINIC | Age: 64
End: 2024-05-30

## 2024-06-03 ENCOUNTER — APPOINTMENT (OUTPATIENT)
Dept: ORTHOPEDIC SURGERY | Facility: CLINIC | Age: 64
End: 2024-06-03

## 2024-06-17 ENCOUNTER — NON-APPOINTMENT (OUTPATIENT)
Age: 64
End: 2024-06-17

## 2024-07-28 NOTE — ED ADULT TRIAGE NOTE - BSA (M2)
1.58 Patient care and plan discussed and reviewed with Advanced Care Provider. Plan as outlined above edited by me to reflect our discussion.

## 2024-08-07 ENCOUNTER — RX CHANGE (OUTPATIENT)
Age: 64
End: 2024-08-07

## 2024-08-07 ENCOUNTER — APPOINTMENT (OUTPATIENT)
Dept: OBGYN | Facility: CLINIC | Age: 64
End: 2024-08-07

## 2024-08-07 PROBLEM — N89.8 VAGINAL DISCHARGE: Status: ACTIVE | Noted: 2024-08-07

## 2024-08-07 PROBLEM — L29.2 VULVAR ITCHING: Status: ACTIVE | Noted: 2024-08-07

## 2024-08-07 PROCEDURE — 99459 PELVIC EXAMINATION: CPT

## 2024-08-07 PROCEDURE — 99214 OFFICE O/P EST MOD 30 MIN: CPT

## 2024-08-07 NOTE — END OF VISIT
[FreeTextEntry4] : I, Luz Mayo, acted as a scribe on behalf of Dr. Karan Vera on 08/07/2024 .   All medical entries made by the scribe were at my, Dr. Karan Vera, direction and personally dictated by me on 08/07/2024. I have reviewed the chart and agree that the record accurately reflects my personal performance of the history, physical exam, assessment and plan. I have also personally directed, reviewed, and agreed with the chart.

## 2024-08-07 NOTE — PHYSICAL EXAM
[Chaperone Present] : A chaperone was present in the examining room during all aspects of the physical examination [Labia Majora] : normal [Labia Minora] : normal [Normal] : normal [Uterine Adnexae] : normal [FreeTextEntry3] : All medical record entries made by the Scribe, Luz Mayo, were at the direction and personally dictated by the attending physician. I, the attending physician, have reviewed the chart and agree that the record accurately reflects my personal performance of the history, physical exam, assessment and plan. I have also personally directed, reviewed, and agreed with the chart. [FreeTextEntry1] : left labial ulcer

## 2024-08-07 NOTE — HISTORY OF PRESENT ILLNESS
[FreeTextEntry1] : ANKUR BRYAN is a 63 year old patient  LMP menopause presenting for a follow-up visit. Pt was taking Metronidazole due to a GI infection and then started getting vaginal irritation.

## 2024-08-09 ENCOUNTER — APPOINTMENT (OUTPATIENT)
Dept: OBGYN | Facility: CLINIC | Age: 64
End: 2024-08-09

## 2024-11-07 NOTE — STROKE CODE NOTE - NIH STROKE SCALE: TOTAL
called patient confirmed referral for orthopedics attn: carole Denise is in the system waiting to get authorized by referral coordinator.   
7

## 2025-02-27 ENCOUNTER — APPOINTMENT (OUTPATIENT)
Dept: OBGYN | Facility: CLINIC | Age: 65
End: 2025-02-27
Payer: COMMERCIAL

## 2025-02-27 VITALS
HEIGHT: 62 IN | WEIGHT: 126 LBS | DIASTOLIC BLOOD PRESSURE: 73 MMHG | BODY MASS INDEX: 23.19 KG/M2 | SYSTOLIC BLOOD PRESSURE: 125 MMHG | HEART RATE: 69 BPM

## 2025-02-27 DIAGNOSIS — Z01.419 ENCOUNTER FOR GYNECOLOGICAL EXAMINATION (GENERAL) (ROUTINE) W/OUT ABNORMAL FINDINGS: ICD-10-CM

## 2025-02-27 DIAGNOSIS — Z12.39 ENCOUNTER FOR OTHER SCREENING FOR MALIGNANT NEOPLASM OF BREAST: ICD-10-CM

## 2025-02-27 DIAGNOSIS — Z13.820 ENCOUNTER FOR SCREENING FOR OSTEOPOROSIS: ICD-10-CM

## 2025-02-27 PROCEDURE — 99459 PELVIC EXAMINATION: CPT

## 2025-02-27 PROCEDURE — 99396 PREV VISIT EST AGE 40-64: CPT

## 2025-02-28 LAB — HPV HIGH+LOW RISK DNA PNL CVX: NOT DETECTED

## 2025-03-03 LAB — CYTOLOGY CVX/VAG DOC THIN PREP: ABNORMAL

## 2025-03-25 ENCOUNTER — APPOINTMENT (OUTPATIENT)
Dept: ULTRASOUND IMAGING | Facility: CLINIC | Age: 65
End: 2025-03-25
Payer: COMMERCIAL

## 2025-03-25 ENCOUNTER — APPOINTMENT (OUTPATIENT)
Dept: MAMMOGRAPHY | Facility: CLINIC | Age: 65
End: 2025-03-25
Payer: COMMERCIAL

## 2025-03-25 ENCOUNTER — APPOINTMENT (OUTPATIENT)
Dept: RADIOLOGY | Facility: CLINIC | Age: 65
End: 2025-03-25
Payer: COMMERCIAL

## 2025-03-25 ENCOUNTER — RESULT REVIEW (OUTPATIENT)
Age: 65
End: 2025-03-25

## 2025-03-25 PROCEDURE — 77063 BREAST TOMOSYNTHESIS BI: CPT

## 2025-03-25 PROCEDURE — 77067 SCR MAMMO BI INCL CAD: CPT

## 2025-03-25 PROCEDURE — 76641 ULTRASOUND BREAST COMPLETE: CPT | Mod: 50

## 2025-03-25 PROCEDURE — 77080 DXA BONE DENSITY AXIAL: CPT

## 2025-09-11 DIAGNOSIS — Z13.29 ENCOUNTER FOR SCREENING FOR OTHER SUSPECTED ENDOCRINE DISORDER: ICD-10-CM

## 2025-09-11 DIAGNOSIS — Z13.220 ENCOUNTER FOR SCREENING FOR LIPOID DISORDERS: ICD-10-CM

## 2025-09-11 DIAGNOSIS — Z13.21 ENCOUNTER FOR SCREENING FOR NUTRITIONAL DISORDER: ICD-10-CM

## 2025-09-11 DIAGNOSIS — Z13.1 ENCOUNTER FOR SCREENING FOR DIABETES MELLITUS: ICD-10-CM

## 2025-09-18 ENCOUNTER — APPOINTMENT (OUTPATIENT)
Dept: INTERNAL MEDICINE | Facility: CLINIC | Age: 65
End: 2025-09-18

## 2025-09-20 LAB
25(OH)D3 SERPL-MCNC: 35.9 NG/ML
ALBUMIN SERPL ELPH-MCNC: 4.3 G/DL
ALP BLD-CCNC: 76 U/L
ALT SERPL-CCNC: 27 U/L
ANION GAP SERPL CALC-SCNC: 11 MMOL/L
AST SERPL-CCNC: 26 U/L
BASOPHILS # BLD AUTO: 0.05 K/UL
BASOPHILS NFR BLD AUTO: 0.8 %
BILIRUB SERPL-MCNC: 0.3 MG/DL
BUN SERPL-MCNC: 14 MG/DL
CALCIUM SERPL-MCNC: 9.2 MG/DL
CHLORIDE SERPL-SCNC: 106 MMOL/L
CHOLEST SERPL-MCNC: 206 MG/DL
CO2 SERPL-SCNC: 24 MMOL/L
CREAT SERPL-MCNC: 0.84 MG/DL
EGFRCR SERPLBLD CKD-EPI 2021: 78 ML/MIN/1.73M2
EOSINOPHIL # BLD AUTO: 0.16 K/UL
EOSINOPHIL NFR BLD AUTO: 2.5 %
ESTIMATED AVERAGE GLUCOSE: 105 MG/DL
GLUCOSE SERPL-MCNC: 87 MG/DL
HBA1C MFR BLD HPLC: 5.3 %
HCT VFR BLD CALC: 43.9 %
HDLC SERPL-MCNC: 58 MG/DL
HGB BLD-MCNC: 14.2 G/DL
IMM GRANULOCYTES NFR BLD AUTO: 0.5 %
LDLC SERPL-MCNC: 138 MG/DL
LYMPHOCYTES # BLD AUTO: 2.87 K/UL
LYMPHOCYTES NFR BLD AUTO: 45.1 %
MAN DIFF?: NORMAL
MCHC RBC-ENTMCNC: 27.4 PG
MCHC RBC-ENTMCNC: 32.3 G/DL
MCV RBC AUTO: 84.6 FL
MONOCYTES # BLD AUTO: 0.29 K/UL
MONOCYTES NFR BLD AUTO: 4.6 %
NEUTROPHILS # BLD AUTO: 2.96 K/UL
NEUTROPHILS NFR BLD AUTO: 46.5 %
NONHDLC SERPL-MCNC: 148 MG/DL
PLATELET # BLD AUTO: 244 K/UL
POTASSIUM SERPL-SCNC: 4.2 MMOL/L
PROT SERPL-MCNC: 6.5 G/DL
RBC # BLD: 5.19 M/UL
RBC # FLD: 14 %
SODIUM SERPL-SCNC: 142 MMOL/L
TRIGL SERPL-MCNC: 54 MG/DL
TSH SERPL-ACNC: 2.67 UIU/ML
VIT B12 SERPL-MCNC: 817 PG/ML
WBC # FLD AUTO: 6.36 K/UL

## 2025-09-23 PROBLEM — R94.6 ABNORMAL THYROID EXAM: Status: ACTIVE | Noted: 2025-09-23

## 2025-09-23 PROBLEM — Z12.11 COLON CANCER SCREENING: Status: ACTIVE | Noted: 2025-09-23

## 2025-09-23 PROBLEM — Z23 ENCOUNTER FOR IMMUNIZATION: Status: ACTIVE | Noted: 2025-09-23 | Resolved: 2025-10-07
